# Patient Record
Sex: FEMALE | Race: BLACK OR AFRICAN AMERICAN | Employment: FULL TIME | URBAN - METROPOLITAN AREA
[De-identification: names, ages, dates, MRNs, and addresses within clinical notes are randomized per-mention and may not be internally consistent; named-entity substitution may affect disease eponyms.]

---

## 2017-02-02 ENCOUNTER — TELEPHONE (OUTPATIENT)
Dept: FAMILY MEDICINE CLINIC | Age: 52
End: 2017-02-02

## 2017-02-02 NOTE — TELEPHONE ENCOUNTER
I called and spoke with pt inquiring if she is going to see another PCP within the practice since Dr. Shani Mccormick is no longer here with the practice. Pt is currently in the process of switching to a different PCP outside of the practice. Verbalization was understood with no further questions or concerns.

## 2017-02-08 NOTE — TELEPHONE ENCOUNTER
Last visit here in November was with Hugh Goff and patient's lab was checked end of December.  She will need to set up visit to meet Dr Janene Ann as soon as this is possible

## 2017-02-09 RX ORDER — ATORVASTATIN CALCIUM 10 MG/1
TABLET, FILM COATED ORAL
Qty: 90 TAB | Refills: 2 | Status: SHIPPED | OUTPATIENT
Start: 2017-02-09

## 2017-12-04 NOTE — PERIOP NOTES
Spoke with Swati Zeng from Dr. Santana Ast office requesting orders and an H&P for surgery.   DOS: 12/6/2017

## 2017-12-05 ENCOUNTER — ANESTHESIA EVENT (OUTPATIENT)
Dept: SURGERY | Age: 52
End: 2017-12-05
Payer: OTHER GOVERNMENT

## 2017-12-06 ENCOUNTER — ANESTHESIA (OUTPATIENT)
Dept: SURGERY | Age: 52
End: 2017-12-06
Payer: OTHER GOVERNMENT

## 2017-12-06 ENCOUNTER — HOSPITAL ENCOUNTER (OUTPATIENT)
Age: 52
Setting detail: OUTPATIENT SURGERY
Discharge: HOME | End: 2017-12-06
Attending: ORTHOPAEDIC SURGERY | Admitting: ORTHOPAEDIC SURGERY
Payer: OTHER GOVERNMENT

## 2017-12-06 VITALS
HEIGHT: 70 IN | TEMPERATURE: 98 F | RESPIRATION RATE: 16 BRPM | OXYGEN SATURATION: 98 % | WEIGHT: 150 LBS | SYSTOLIC BLOOD PRESSURE: 122 MMHG | BODY MASS INDEX: 21.47 KG/M2 | DIASTOLIC BLOOD PRESSURE: 74 MMHG | HEART RATE: 74 BPM

## 2017-12-06 PROCEDURE — 77030000032 HC CUF TRNQT ZIMM -B: Performed by: ORTHOPAEDIC SURGERY

## 2017-12-06 PROCEDURE — 76060000031 HC ANESTHESIA FIRST 0.5 HR: Performed by: ORTHOPAEDIC SURGERY

## 2017-12-06 PROCEDURE — 74011250636 HC RX REV CODE- 250/636

## 2017-12-06 PROCEDURE — 74011250636 HC RX REV CODE- 250/636: Performed by: ORTHOPAEDIC SURGERY

## 2017-12-06 PROCEDURE — 74011250637 HC RX REV CODE- 250/637: Performed by: ORTHOPAEDIC SURGERY

## 2017-12-06 PROCEDURE — 76210000006 HC OR PH I REC 0.5 TO 1 HR: Performed by: ORTHOPAEDIC SURGERY

## 2017-12-06 PROCEDURE — 97161 PT EVAL LOW COMPLEX 20 MIN: CPT

## 2017-12-06 PROCEDURE — 97116 GAIT TRAINING THERAPY: CPT

## 2017-12-06 PROCEDURE — 77030018836 HC SOL IRR NACL ICUM -A: Performed by: ORTHOPAEDIC SURGERY

## 2017-12-06 PROCEDURE — 74011250636 HC RX REV CODE- 250/636: Performed by: ANESTHESIOLOGY

## 2017-12-06 PROCEDURE — 88304 TISSUE EXAM BY PATHOLOGIST: CPT | Performed by: ORTHOPAEDIC SURGERY

## 2017-12-06 PROCEDURE — 76210000026 HC REC RM PH II 1 TO 1.5 HR: Performed by: ORTHOPAEDIC SURGERY

## 2017-12-06 PROCEDURE — 74011000250 HC RX REV CODE- 250

## 2017-12-06 PROCEDURE — 76010000154 HC OR TIME FIRST 0.5 HR: Performed by: ORTHOPAEDIC SURGERY

## 2017-12-06 PROCEDURE — 77030020143 HC AIRWY LARYN INTUB CGAS -A: Performed by: ANESTHESIOLOGY

## 2017-12-06 PROCEDURE — 77030011640 HC PAD GRND REM COVD -A: Performed by: ORTHOPAEDIC SURGERY

## 2017-12-06 RX ORDER — LIDOCAINE HYDROCHLORIDE 20 MG/ML
INJECTION, SOLUTION EPIDURAL; INFILTRATION; INTRACAUDAL; PERINEURAL AS NEEDED
Status: DISCONTINUED | OUTPATIENT
Start: 2017-12-06 | End: 2017-12-06 | Stop reason: HOSPADM

## 2017-12-06 RX ORDER — SODIUM CHLORIDE 0.9 % (FLUSH) 0.9 %
5-10 SYRINGE (ML) INJECTION EVERY 8 HOURS
Status: DISCONTINUED | OUTPATIENT
Start: 2017-12-06 | End: 2017-12-06 | Stop reason: HOSPADM

## 2017-12-06 RX ORDER — OFLOXACIN 3 MG/ML
5 SOLUTION/ DROPS OPHTHALMIC 2 TIMES DAILY
COMMUNITY

## 2017-12-06 RX ORDER — DEXAMETHASONE SODIUM PHOSPHATE 4 MG/ML
INJECTION, SOLUTION INTRA-ARTICULAR; INTRALESIONAL; INTRAMUSCULAR; INTRAVENOUS; SOFT TISSUE AS NEEDED
Status: DISCONTINUED | OUTPATIENT
Start: 2017-12-06 | End: 2017-12-06 | Stop reason: HOSPADM

## 2017-12-06 RX ORDER — LIDOCAINE HYDROCHLORIDE 10 MG/ML
0.1 INJECTION, SOLUTION EPIDURAL; INFILTRATION; INTRACAUDAL; PERINEURAL AS NEEDED
Status: DISCONTINUED | OUTPATIENT
Start: 2017-12-06 | End: 2017-12-06 | Stop reason: HOSPADM

## 2017-12-06 RX ORDER — SODIUM CHLORIDE, SODIUM LACTATE, POTASSIUM CHLORIDE, CALCIUM CHLORIDE 600; 310; 30; 20 MG/100ML; MG/100ML; MG/100ML; MG/100ML
100 INJECTION, SOLUTION INTRAVENOUS CONTINUOUS
Status: DISCONTINUED | OUTPATIENT
Start: 2017-12-06 | End: 2017-12-06 | Stop reason: HOSPADM

## 2017-12-06 RX ORDER — SODIUM CHLORIDE, SODIUM LACTATE, POTASSIUM CHLORIDE, CALCIUM CHLORIDE 600; 310; 30; 20 MG/100ML; MG/100ML; MG/100ML; MG/100ML
125 INJECTION, SOLUTION INTRAVENOUS CONTINUOUS
Status: DISCONTINUED | OUTPATIENT
Start: 2017-12-06 | End: 2017-12-06 | Stop reason: HOSPADM

## 2017-12-06 RX ORDER — ONDANSETRON 2 MG/ML
INJECTION INTRAMUSCULAR; INTRAVENOUS AS NEEDED
Status: DISCONTINUED | OUTPATIENT
Start: 2017-12-06 | End: 2017-12-06 | Stop reason: HOSPADM

## 2017-12-06 RX ORDER — SODIUM CHLORIDE 0.9 % (FLUSH) 0.9 %
5-10 SYRINGE (ML) INJECTION AS NEEDED
Status: DISCONTINUED | OUTPATIENT
Start: 2017-12-06 | End: 2017-12-06 | Stop reason: HOSPADM

## 2017-12-06 RX ORDER — DIPHENHYDRAMINE HYDROCHLORIDE 50 MG/ML
12.5 INJECTION, SOLUTION INTRAMUSCULAR; INTRAVENOUS AS NEEDED
Status: DISCONTINUED | OUTPATIENT
Start: 2017-12-06 | End: 2017-12-06 | Stop reason: HOSPADM

## 2017-12-06 RX ORDER — PROPOFOL 10 MG/ML
INJECTION, EMULSION INTRAVENOUS AS NEEDED
Status: DISCONTINUED | OUTPATIENT
Start: 2017-12-06 | End: 2017-12-06 | Stop reason: HOSPADM

## 2017-12-06 RX ORDER — MIDAZOLAM HYDROCHLORIDE 1 MG/ML
INJECTION, SOLUTION INTRAMUSCULAR; INTRAVENOUS AS NEEDED
Status: DISCONTINUED | OUTPATIENT
Start: 2017-12-06 | End: 2017-12-06 | Stop reason: HOSPADM

## 2017-12-06 RX ORDER — HYDROMORPHONE HYDROCHLORIDE 1 MG/ML
.25-1 INJECTION, SOLUTION INTRAMUSCULAR; INTRAVENOUS; SUBCUTANEOUS
Status: DISCONTINUED | OUTPATIENT
Start: 2017-12-06 | End: 2017-12-06 | Stop reason: HOSPADM

## 2017-12-06 RX ORDER — ONDANSETRON 2 MG/ML
4 INJECTION INTRAMUSCULAR; INTRAVENOUS AS NEEDED
Status: DISCONTINUED | OUTPATIENT
Start: 2017-12-06 | End: 2017-12-06 | Stop reason: HOSPADM

## 2017-12-06 RX ORDER — OXYCODONE HYDROCHLORIDE 5 MG/1
5 TABLET ORAL ONCE
Status: COMPLETED | OUTPATIENT
Start: 2017-12-06 | End: 2017-12-06

## 2017-12-06 RX ORDER — FENTANYL CITRATE 50 UG/ML
INJECTION, SOLUTION INTRAMUSCULAR; INTRAVENOUS AS NEEDED
Status: DISCONTINUED | OUTPATIENT
Start: 2017-12-06 | End: 2017-12-06 | Stop reason: HOSPADM

## 2017-12-06 RX ADMIN — FENTANYL CITRATE 50 MCG: 50 INJECTION, SOLUTION INTRAMUSCULAR; INTRAVENOUS at 10:41

## 2017-12-06 RX ADMIN — ONDANSETRON 4 MG: 2 INJECTION INTRAMUSCULAR; INTRAVENOUS at 10:48

## 2017-12-06 RX ADMIN — FENTANYL CITRATE 50 MCG: 50 INJECTION, SOLUTION INTRAMUSCULAR; INTRAVENOUS at 10:35

## 2017-12-06 RX ADMIN — PROPOFOL 150 MG: 10 INJECTION, EMULSION INTRAVENOUS at 10:41

## 2017-12-06 RX ADMIN — DEXAMETHASONE SODIUM PHOSPHATE 8 MG: 4 INJECTION, SOLUTION INTRA-ARTICULAR; INTRALESIONAL; INTRAMUSCULAR; INTRAVENOUS; SOFT TISSUE at 10:48

## 2017-12-06 RX ADMIN — HYDROMORPHONE HYDROCHLORIDE 0.5 MG: 1 INJECTION, SOLUTION INTRAMUSCULAR; INTRAVENOUS; SUBCUTANEOUS at 11:30

## 2017-12-06 RX ADMIN — SODIUM CHLORIDE, SODIUM LACTATE, POTASSIUM CHLORIDE, AND CALCIUM CHLORIDE 100 ML/HR: 600; 310; 30; 20 INJECTION, SOLUTION INTRAVENOUS at 09:11

## 2017-12-06 RX ADMIN — HYDROMORPHONE HYDROCHLORIDE 0.5 MG: 1 INJECTION, SOLUTION INTRAMUSCULAR; INTRAVENOUS; SUBCUTANEOUS at 11:19

## 2017-12-06 RX ADMIN — OXYCODONE HYDROCHLORIDE 5 MG: 5 TABLET ORAL at 13:10

## 2017-12-06 RX ADMIN — MIDAZOLAM HYDROCHLORIDE 2 MG: 1 INJECTION, SOLUTION INTRAMUSCULAR; INTRAVENOUS at 10:35

## 2017-12-06 RX ADMIN — LIDOCAINE HYDROCHLORIDE 40 MG: 20 INJECTION, SOLUTION EPIDURAL; INFILTRATION; INTRACAUDAL; PERINEURAL at 10:41

## 2017-12-06 RX ADMIN — CEFAZOLIN 0.2 G: 1 INJECTION, POWDER, FOR SOLUTION INTRAMUSCULAR; INTRAVENOUS; PARENTERAL at 09:12

## 2017-12-06 NOTE — ANESTHESIA POSTPROCEDURE EVALUATION
Post-Anesthesia Evaluation and Assessment    Patient: Em Moncada MRN: [id number]  SSN: [id number]    YOB: 1965  Age: 47 y.o. Sex: female       Cardiovascular Function/Vital Signs  Visit Vitals    /74    Pulse 74    Temp 36.7 °C (98 °F)    Resp 16    Ht 5' 9.75\" (1.772 m)    Wt 68 kg (150 lb)    SpO2 98%    BMI 21.68 kg/m2       Patient is status post general anesthesia for Procedure(s):  MASS EXCISION RIGHT ANKLE . Nausea/Vomiting: None    Postoperative hydration reviewed and adequate. Pain:  Pain Scale 1: Numeric (0 - 10) (12/06/17 1131)  Pain Intensity 1: 5 (12/06/17 1131)   Managed    Neurological Status:   Neuro (WDL): Exceptions to WDL (12/06/17 1106)  Neuro  Neurologic State: Drowsy (12/06/17 1106)   At baseline    Mental Status and Level of Consciousness: Arousable    Pulmonary Status:   O2 Device: Room air (12/06/17 1113)   Adequate oxygenation and airway patent    Complications related to anesthesia: None    Post-anesthesia assessment completed.  No concerns    Signed By: Maynor White MD     December 6, 2017

## 2017-12-06 NOTE — IP AVS SNAPSHOT
[address] [phone] Patient: Gumaro Castellanos MRN: [id number] :1965 About your hospitalization You were admitted on:  2017 You last received care in the:  [facility] PACU You were discharged on:  2017 Why you were hospitalized Your primary diagnosis was:  Not on File Things You Need To Do (next 8 weeks) Follow up with Anita Navarro MD  
  
Phone:  [phone] Where:  [address] [facility], [address] [facility] Discharge Orders None A check danna indicates which time of day the medication should be taken. My Medications TAKE these medications as instructed Instructions Each Dose to Equal  
 Morning Noon Evening Bedtime  
 acyclovir 5 % ointment Commonly known as:  ZOVIRAX Your last dose was: Your next dose is:    
   
   
 Apply  to affected area every three (3) hours. atorvastatin 10 mg tablet Commonly known as:  LIPITOR Your last dose was: Your next dose is: TAKE 1 TABLET BY MOUTH EVERY DAY  
     
   
   
   
  
 CALCIUM 500+D 500 mg(1,250mg) -200 unit per tablet Generic drug:  calcium-vitamin D Your last dose was: Your next dose is: Take 2 tablets by mouth two (2) times a day. 2 Tab COLLAGEN Your last dose was: Your next dose is:    
   
   
 by Does Not Apply route. COQ10  100-100 mg-unit Cap Generic drug:  coenzyme q10-vitamin e Your last dose was: Your next dose is: Take  by mouth. LAURA EXTRACT PO Your last dose was: Your next dose is: Take  by mouth. M-VIT PO Your last dose was: Your next dose is: Take 1 Tab by mouth daily. 1 Tab  
    
   
   
   
  
 ofloxacin 0.3 % ophthalmic solution Commonly known as:  FLOXIN Your last dose was: Your next dose is:    
   
   
 Administer 5 Drops into each ear two (2) times a day. 5 Drop OMEGA 3-VITAMIN E-FISH OIL PO Your last dose was: Your next dose is: Take 1 Tab by mouth three (3) times daily (with meals). 1 Tab PROBIOTIC 4X 10-15 mg Tbec Generic drug:  B.infantis-B.ani-B.long-B.bifi Your last dose was: Your next dose is: Take  by mouth. RESTASIS 0.05 % ophthalmic emulsion Generic drug:  cycloSPORINE Your last dose was: Your next dose is: VITAMIN C 500 mg tablet Generic drug:  ascorbic acid (vitamin C) Your last dose was: Your next dose is: Take 500 mg by mouth daily. 500 mg Discharge Instructions DISCHARGE SUMMARY from your Nurse The following personal items collected during your admission are returned to you:  
Dental Appliance: Dental Appliances: None Vision:   
Hearing Aid:   
Jewelry:   
Clothing:   
Other Valuables:   
Valuables sent to safe:   
 
PATIENT INSTRUCTIONS: 
 
After general anesthesia or intravenous sedation, for 24 hours or while taking prescription Narcotics: · Limit your activities · Do not drive and operate hazardous machinery · Do not make important personal or business decisions · Do  not drink alcoholic beverages · If you have not urinated within 8 hours after discharge, please contact your surgeon on call. Report the following to your surgeon: 
· Excessive pain, swelling, redness or odor of or around the surgical area · Temperature over 100.5 · Nausea and vomiting lasting longer than 4 hours or if unable to take medications · Any signs of decreased circulation or nerve impairment to extremity: change in color, persistent  numbness, tingling, coldness or increase pain · Any questions [address] Breathing deep and coughing are very important exercises to do after surgery. Deep breathing and coughing open the little air tubes and air sacks in your lungs. You take deep breaths every day. You may not even notice - it is just something you do when you sigh or yawn. It is a natural exercise you do to keep these air passages open. After surgery, take deep breaths and cough, on purpose. Coughing and deep breathing help prevent bronchitis and pneumonia after surgery. If you had chest or belly surgery, use a pillow as a \"hug jt\" and hold it tightly to your chest or belly when you cough. DIRECTIONS: 
6. Take 10 to 15 slow deep breaths every hour while awake. 7. Breathe in deeply, and hold it for 2 seconds. 8. Exhale slowly through puckered lips, like blowing up a balloon. 9. After every 4th or 5th deep breath, hug your pillow to your chest or belly and give a hard, deep cough. Yes, it will probably hurt. But doing this exercise is very important part of healing after surgery. Take your pain medicine to help you do this exercise without too much pain. IF YOU HAVE BEEN DIAGNOSED WITH SLEEP APNEA, PLEASE USE YOUR SLEEP APNEA DEVICE OR CPAP MACHINE WHEN YOU INTEND TO NAP AFTER TAKING PAIN MEDICATION. Ankle Pumps Ankle pumps increase the circulation of oxygenated blood to your lower extremities and decrease your risk for circulation problems such as blood clots. They also stretch the muscles, tendons and ligaments in your foot and ankle, and prevent joint contracture in the ankle and foot, especially after surgeries on the legs. It is important to do ankle pump exercises regularly after surgery because immobility increases your risk for developing a blood clot.   Your doctor may also have you take an Aspirin for the next few days as well. If your doctor did not ask you to take an Aspirin, consult with him before starting Aspirin therapy on your own. Slowly point your foot forward, feeling the muscles on the top of your lower leg stretch, and hold this position for 5 seconds. Next, pull your foot back toward you as far as possible, stretching the calf muscles, and hold that position for 5 seconds. Repeat with the other foot. Perform 10 repetitions every hour while awake for both ankles if possible (down and then up with the foot once is one repetition). You should feel gentle stretching of the muscles in your lower leg when doing this exercise. If you feel pain, or your range of motion is limited, don't  Push too hard. Only go the limit your joint and muscles will let you go. If you have increasing pain, progressively worsening leg warmth or swelling, STOP the exercise and call your doctor. Below is information about the medications your doctor is prescribing after your visit: 
 
 
· Oxycodone Introducing [facility]! Dear Josefina Fowler: 
Thank you for requesting a [employer] account. Our records indicate that you already have an active [employer] account. You can access your account anytime at [email or web address] [employer] Did you know that you can access your hospital and ER discharge instructions at any time in [employer]? You can also review all of your test results from your hospital stay or ER visit. Additional Information If you have questions, please visit the Frequently Asked Questions section of the [employer] website at [email or web address] Remember, [employer] is NOT to be used for urgent needs. For medical emergencies, dial 911. Now available from your iPhone and Android! Providers Seen During Your Hospitalization Provider Specialty Primary office phone Sharita Hardy MD Orthopedic Surgery [phone] Your Primary Care Physician (PCP) Primary Care Physician Office Phone Office Fax 4888 [address] [phone] You are allergic to the following Allergen Reactions Clarithromycin Diarrhea Other (comments) Metallic taste Iodine Nausea and Vomiting Pcn (Penicillins) Rash Ancef challenge completed on 12/6/17. No reactions noted. Recent Documentation Height Weight BMI OB Status Smoking Status 1.772 m 68 kg 21.68 kg/m2 Postmenopausal Never Smoker Emergency Contacts Name Discharge Info Relation Home Work Mobile Ly Jerry DISCHARGE CAREGIVER [3] Other Relative [6] [phone] Magdi Hawthorne DISCHARGE CAREGIVER [3] Spouse [3] [phone] Patient Belongings The following personal items are in your possession at time of discharge: 
  Dental Appliances: None Please provide this summary of care documentation to your next provider. Signatures-by signing, you are acknowledging that this After Visit Summary has been reviewed with you and you have received a copy. Patient Signature:  ____________________________________________________________ Date:  ____________________________________________________________  
  
Ashish [place] Provider Signature:  ____________________________________________________________ Date:  ____________________________________________________________

## 2017-12-06 NOTE — OP NOTES
[facility]   [facility], [address]   OP NOTE       Name:  Alesha Erwin   MR#:  [id number]   :  1965   Account #:  [de-identified]    Surgery Date:  2017   Date of Adm:  2017       PREOPERATIVE DIAGNOSIS: Painful soft tissue mass, right ankle. POSTOPERATIVE DIAGNOSIS: Painful soft tissue mass, right ankle. PROCEDURES PERFORMED: Excisional biopsy, right ankle. SURGEON: Vinicius Mccracken MD    ANESTHESIA: General.    COMPLICATIONS: None encountered. TOURNIQUET TIME: 7 minutes. SPECIMENS REMOVED: Soft tissue mass was sent to Pathology. COMPLICATIONS: None. ESTIMATED BLOOD LOSS: Nil. DESCRIPTION OF PROCEDURE: After consents were obtained and   preoperative sedation, the patient was taken to the operating suites   and underwent general anesthesia without difficulty. A pneumatic   tourniquet was placed around the right thigh, and the right lower   extremity was scrubbed and draped in the usual sterile fashion. The   pea-sized mass was centered over the subcutaneous margin of the   distal fibula and was quite mobile. The incision was mapped over the   mass longitudinally, starting proximal to distal over the center of the   mass. The incision was approximately 2 cm in length. The incision was   carried through skin and subcutaneous tissue, down to the mass which   was well encapsulated and mobile on the soft tissues. The soft tissue   was elevated on the superficial surface, as well as the deep surface of   the mass and the mass was excised without difficulty. No other   abnormalities were noted in the wound. The mass was sent to   Pathology. The wound was copiously irrigated and closed with a   subcuticular 3-0 Monocryl suture. Steri-Strips were applied to secure   closure and a sterile compressive dressing was applied.  The tourniquet   was deflated and the patient was awakened from anesthesia and taken   to the recovery room in satisfactory condition.         MD Keenan Guerrero / BART   D:  12/06/2017   11:06   T:  12/06/2017   13:41   Job #:  [id number]

## 2017-12-06 NOTE — BRIEF OP NOTE
BRIEF OPERATIVE NOTE    Date of Procedure: 12/6/2017   Preoperative Diagnosis: MASS RIGHT ANKLE   Postoperative Diagnosis: MASS RIGHT ANKLE     Procedure(s):  MASS EXCISION RIGHT ANKLE   Surgeon(s) and Role:     * Chiqui Mendez MD - Primary         Assistant Staff:       Surgical Staff:  Circ-1: Shen Yi RN  Circ-Relief: Torie Manriquez RN  Circ-Intern: Lorna Shah RN  Scrub Tech-1: Ottoniel Benítez  Surg Asst-1: Joey Marques  Event Time In   Incision Start 1051   Incision Close 1057     Anesthesia: General   Estimated Blood Loss: 0  Specimens:   ID Type Source Tests Collected by Time Destination   1 : Mass right ankle Preservative Ankle  Chiqui Mendez MD 12/6/2017 1053 Pathology      Findings: 0   Complications: 0  Implants: * No implants in log *

## 2017-12-06 NOTE — ANESTHESIA PREPROCEDURE EVALUATION
Anesthetic History   No history of anesthetic complications            Review of Systems / Medical History  Patient summary reviewed and pertinent labs reviewed    Pulmonary  Within defined limits                 Neuro/Psych   Within defined limits           Cardiovascular                Pertinent negatives: Hyperlipidemia: borderline.   Exercise tolerance: >4 METS     GI/Hepatic/Renal  Within defined limits              Endo/Other             Other Findings   Comments: Single kidney-donated other kidney           Physical Exam    Airway  Mallampati: II  TM Distance: 4 - 6 cm  Neck ROM: normal range of motion   Mouth opening: Normal     Cardiovascular    Rhythm: regular  Rate: normal         Dental    Dentition: Upper dentition intact and Lower dentition intact     Pulmonary  Breath sounds clear to auscultation               Abdominal  GI exam deferred       Other Findings            Anesthetic Plan    ASA: 2  Anesthesia type: general          Induction: Intravenous  Anesthetic plan and risks discussed with: Patient

## 2017-12-06 NOTE — PROGRESS NOTES
physical Therapy EVALUATION  (Ambulatory surgery, emergency room & recovery room patients)    Patient: Geeta Mills (66 y.o. female)  Date: 12/6/2017  Primary Diagnosis and Medical History: MASS RIGHT ANKLE   Procedure(s) (LRB):  MASS EXCISION RIGHT ANKLE  (Right) Day of Surgery   Past Medical History:   Diagnosis Date    Advanced care planning/counseling discussion 4/8/16    Arrhythmia     Cervical neck pain with evidence of disc disease     C4-C5 with radicular pain    Dry eye syndrome 6/2006    started after mirena IUD    Hyperlipidemia 2/11/2010    Ill-defined condition     heart murmur    Other ill-defined conditions(799.89)     abdominal hernia - monitoring    Solitary kidney     donated right kidney     Past Surgical History:   Procedure Laterality Date    ENDOSCOPY, COLON, DIAGNOSTIC  080109    HX GYN  [id number]    Dil and curr    HX HERNIA REPAIR  06/30/2015    [facility] Ventral Hernia [facility] (Dr. Lynnette Bernstein)    HX NEPHRECTOMY  [id number]    donated to mother     HX OTHER SURGICAL  7/7/16    cystoscopy    HX TYMPANOSTOMY Left 02/18/2015    Dr. Tanner Bassett     Patient Active Problem List   Diagnosis Code    Dry eye syndrome H04.129    Hyperlipidemia E78.5    Lung nodules R91.8    Breast mass N63.0    Herpes B00.9    Solitary kidney Q60.0    Back pain M54.9     Prior Level of Function/Home Situation: Independent and without an assistive device  Personal factors and/or comorbidities impacting plan of care:     Home Situation  Home Environment: Private residence  Ordered Weight Bearing Status:  right toe touch  Equipment: crutches    EXAMINATION/PRESENTATION/DECISION MAKING:   Critical Behavior:  Neurologic State: Drowsy           Transfers:  Overall level of assistance required following instruction: contact guard assist given verbal using axillary crutches.   Ambulation:  Weight bearing status during ambulation:       Distance (ft): 50 Feet (ft)  Assistive Device: Gait belt, Crutches  Ambulation - Level of Assistance: Contact guard assistance     Stair Management:           Strength/ROM Limitations:  Generally decreased but functional  Special Tests:  Barthel Index:    Bathin  Bladder: 10  Bowels: 10  Groomin  Dressin  Feeding: 10  Mobility: 5  Stairs: 5  Toilet Use: 10  Transfer (Bed to Chair and Back): 10  Total: 75       Barthel and G-code impairment scale:  Percentage of impairment CH  0% CI  1-19% CJ  20-39% CK  40-59% CL  60-79% CM  80-99% CN  100%   Barthel Score 0-100 100 99-80 79-60 59-40 20-39 1-19   0   Barthel Score 0-20 20 17-19 13-16 9-12 5-8 1-4 0      The Barthel ADL Index: Guidelines  1. The index should be used as a record of what a patient does, not as a record of what a patient could do. 2. The main aim is to establish degree of independence from any help, physical or verbal, however minor and for whatever reason. 3. The need for supervision renders the patient not independent. 4. A patient's performance should be established using the best available evidence. Asking the patient, friends/relatives and nurses are the usual sources, but direct observation and common sense are also important. However direct testing is not needed. 5. Usually the patient's performance over the preceding 24-48 hours is important, but occasionally longer periods will be relevant. 6. Middle categories imply that the patient supplies over 50 per cent of the effort. 7. Use of aids to be independent is allowed. Darryle Chant., Barthel, D.W. (2370). Functional evaluation: the Barthel Index. [address] [facility] (14)2. Diego Hernandez kevon MAKI LantiguaJAnaMYE, Vance Vang., Mitzi South., [facility], [address] [place] [address] (). Measuring the change indisability after inpatient rehabilitation; comparison of the responsiveness of the Barthel Index and Functional [place] Measure. Journal of Neurology, Neurosurgery, and Psychiatry, 66(4), 182-163.   PATEL Torres.LOREN, CAREY Campos, & Amy Ceja MAnaA. (2004.) Assessment of post-stroke quality of life in cost-effectiveness studies: The usefulness of the Barthel Index and the EuroQoL-5D. Quality of Life Research, 13, 198-43        In compliance with CMSs Claims Based Outcome Reporting, the following G-code set was chosen for this patient based on their primary functional limitation being treated: The outcome measure chosen to determine the severity of the functional limitation was the Barthel Index with a score of 75/100 which was correlated with the impairment scale. ? Mobility - Walking and Moving Around:     - CURRENT STATUS: CJ - 20%-39% impaired, limited or restricted    - GOAL STATUS: CI - 1%-19% impaired, limited or restricted    - D/C STATUS:  ---------------To be determined---------------        Physical Therapy Evaluation Charge Determination   History Examination Presentation Decision-Making   MEDIUM  Complexity : 1-2 comorbidities / personal factors will impact the outcome/ POC  MEDIUM Complexity : 3 Standardized tests and measures addressing body structure, function, activity limitation and / or participation in recreation  LOW Complexity : Stable, uncomplicated  Other outcome measures Barthel Index  LOW       Based on the above components, the patient evaluation is determined to be of the following complexity level: LOW     Pain:Pain Scale 1: Numeric (0 - 10)  Pain Intensity 1: 3  Pain Location 1: Ankle    Education:  Role of P.T. explained to the patient:  [x]  Yes              []   No       Topics addressed: Comments:   [x]                                    Device use and technique Axillary crutches, instructed in TTWB and LE sequencing to maintain precautions. Required verbal cuing with upright activity to maintain. No loss of balance.  present and instructed in safety and LE sequencing, he verbalized understanding.    [x]                                    Transfer technique Stand pivot transfer, axillary crutches, CGA   [x] Gait training 50 feet with axillary crutches, CGA. No loss of balance, requires increased verbal cuing for TTWB adherence, able to carry over once cued.  with patient for return to home   []                                    Stair training        Patient is discharged from physical therapy at this time.     Brad Parker, PT, DPT   Time Calculation: 20 mins

## 2017-12-06 NOTE — DISCHARGE INSTRUCTIONS
DISCHARGE SUMMARY from your Nurse    The following personal items collected during your admission are returned to you:   Dental Appliance: Dental Appliances: None  Vision:    Hearing Aid:    Jewelry:    Clothing:    Other Valuables:    Valuables sent to safe:      PATIENT INSTRUCTIONS:    After general anesthesia or intravenous sedation, for 24 hours or while taking prescription Narcotics:  · Limit your activities  · Do not drive and operate hazardous machinery  · Do not make important personal or business decisions  · Do  not drink alcoholic beverages  · If you have not urinated within 8 hours after discharge, please contact your surgeon on call. Report the following to your surgeon:  · Excessive pain, swelling, redness or odor of or around the surgical area  · Temperature over 100.5  · Nausea and vomiting lasting longer than 4 hours or if unable to take medications  · Any signs of decreased circulation or nerve impairment to extremity: change in color, persistent  numbness, tingling, coldness or increase pain  · Any questions    COUGH AND DEEP BREATHE    Breathing deep and coughing are very important exercises to do after surgery. Deep breathing and coughing open the little air tubes and air sacks in your lungs. You take deep breaths every day. You may not even notice - it is just something you do when you sigh or yawn. It is a natural exercise you do to keep these air passages open. After surgery, take deep breaths and cough, on purpose. Coughing and deep breathing help prevent bronchitis and pneumonia after surgery. If you had chest or belly surgery, use a pillow as a \"hug buddy\" and hold it tightly to your chest or belly when you cough. DIRECTIONS:  6. Take 10 to 15 slow deep breaths every hour while awake. 7. Breathe in deeply, and hold it for 2 seconds. 8. Exhale slowly through puckered lips, like blowing up a balloon.   9. After every 4th or 5th deep breath, hug your pillow to your chest or belly and give a hard, deep cough. Yes, it will probably hurt. But doing this exercise is very important part of healing after surgery. Take your pain medicine to help you do this exercise without too much pain. IF YOU HAVE BEEN DIAGNOSED WITH SLEEP APNEA, PLEASE USE YOUR SLEEP APNEA DEVICE OR CPAP MACHINE WHEN YOU INTEND TO NAP AFTER TAKING PAIN MEDICATION. Ankle Pumps    Ankle pumps increase the circulation of oxygenated blood to your lower extremities and decrease your risk for circulation problems such as blood clots. They also stretch the muscles, tendons and ligaments in your foot and ankle, and prevent joint contracture in the ankle and foot, especially after surgeries on the legs. It is important to do ankle pump exercises regularly after surgery because immobility increases your risk for developing a blood clot. Your doctor may also have you take an Aspirin for the next few days as well. If your doctor did not ask you to take an Aspirin, consult with him before starting Aspirin therapy on your own. Slowly point your foot forward, feeling the muscles on the top of your lower leg stretch, and hold this position for 5 seconds. Next, pull your foot back toward you as far as possible, stretching the calf muscles, and hold that position for 5 seconds. Repeat with the other foot. Perform 10 repetitions every hour while awake for both ankles if possible (down and then up with the foot once is one repetition). You should feel gentle stretching of the muscles in your lower leg when doing this exercise. If you feel pain, or your range of motion is limited, don't  Push too hard. Only go the limit your joint and muscles will let you go. If you have increasing pain, progressively worsening leg warmth or swelling, STOP the exercise and call your doctor.      Below is information about the medications your doctor is prescribing after your visit:    Other information in your discharge envelope:  []     PRESCRIPTIONS  []     PHYSICAL THERAPY PRESCRIPTION  []     APPOINTMENT CARDS  []     Regional Anesthesia Pamphlet for block or block with On-Q Catheter from Anesthesia Service  []     Medical device information sheets/pamphlets from their    []     School/work excuse note. []     /parent work excuse note. These are general instructions for a healthy lifestyle:    *  Please give a list of your current medications to your Primary Care Provider. *  Please update this list whenever your medications are discontinued, doses are      changed, or new medications (including over-the-counter products) are added. *  Please carry medication information at all times in case of emergency situations. About Smoking  No smoking / No tobacco products / Avoid exposure to second hand smoke    Surgeon General's Warning:  Quitting smoking now greatly reduces serious risk to your health. Obesity, smoking, and sedentary lifestyle greatly increases your risk for illness and disease. A healthy diet, regular physical exercise & weight monitoring are important for maintaining a healthy lifestyle. Congestive Heart Failure  You may be retaining fluid if you have a history of heart failure or if you experience any of the following symptoms:  Weight gain of 3 pounds or more overnight or 5 pounds in a week, increased swelling in our hands or feet or shortness of breath while lying flat in bed. Please call your doctor as soon as you notice any of these symptoms; do not wait until your next office visit. Recognize signs and symptoms of STROKE:  F - face looks uneven  A - arms unable to move or move even  S - speech slurred or non-existent  T - time-call 911 as soon as signs and symptoms begin-DO NOT go         Back to bed or wait to see if you get better-TIME IS BRAIN.       Warning signs of HEART ATTACK  Call 911 if you have these symptoms    · Chest discomfort. Most heart attacks involve discomfort in the center of the chest that lasts more than a few minutes, or that goes away and comes back. It can feel like uncomfortable pressure, squeezing, fullness, or pain. · Discomfort in other areas of the upper body. Symptoms can include pain or discomfort in one or both        Arms, the back, neck, jaw, or stomach. ·  Shortness of breath with or without chest discomfort. · Other signs may include breaking out in a cold sweat, nausea, or lightheadedness    Don't wait more than five minutes to call 911 - MINUTES MATTER! Fast action can save your life. Calling 911 is almost always the fastest way to get lifesaving treatment. Emergency Medical Services staff can begin treatment when they arrive - up to an hour sooner than if someone gets to the hospital by car. [facility] MEDICATION AND SIDE EFFECT GUIDE    The [employer] MEDICATION AND SIDE EFFECT GUIDE was provided to the PATIENT AND CARE PROVIDER.   Information provided includes instruction about drug purpose and common side effects for the following medications:    · Oxycodone

## 2020-03-10 ENCOUNTER — HOSPITAL ENCOUNTER (OUTPATIENT)
Dept: CT IMAGING | Age: 55
Discharge: HOME | End: 2020-03-10
Payer: OTHER GOVERNMENT

## 2020-03-10 DIAGNOSIS — J45.909 ASTHMA: ICD-10-CM

## 2020-03-10 DIAGNOSIS — R91.8 LUNG NODULES: ICD-10-CM

## 2020-03-10 DIAGNOSIS — R07.89 OTHER CHEST PAIN: ICD-10-CM

## 2020-03-10 DIAGNOSIS — R06.00 DYSPNEA: ICD-10-CM

## 2020-03-10 PROCEDURE — 71250 CT THORAX DX C-: CPT

## 2020-07-03 ENCOUNTER — OFFICE VISIT (OUTPATIENT)
Dept: URGENT CARE | Age: 55
End: 2020-07-03

## 2020-07-03 VITALS — OXYGEN SATURATION: 96 % | TEMPERATURE: 98.4 F | HEART RATE: 77 BPM | RESPIRATION RATE: 16 BRPM

## 2020-07-03 DIAGNOSIS — Z20.822 ENCOUNTER FOR LABORATORY TESTING FOR COVID-19 VIRUS: Primary | ICD-10-CM

## 2020-07-07 ENCOUNTER — HOSPITAL ENCOUNTER (OUTPATIENT)
Age: 55
Setting detail: OUTPATIENT SURGERY
Discharge: HOME | End: 2020-07-07
Attending: INTERNAL MEDICINE | Admitting: INTERNAL MEDICINE
Payer: OTHER GOVERNMENT

## 2020-07-07 ENCOUNTER — ANESTHESIA EVENT (OUTPATIENT)
Dept: ENDOSCOPY | Age: 55
End: 2020-07-07
Payer: OTHER GOVERNMENT

## 2020-07-07 ENCOUNTER — ANESTHESIA (OUTPATIENT)
Dept: ENDOSCOPY | Age: 55
End: 2020-07-07
Payer: OTHER GOVERNMENT

## 2020-07-07 VITALS
HEART RATE: 69 BPM | BODY MASS INDEX: 21.33 KG/M2 | WEIGHT: 149 LBS | SYSTOLIC BLOOD PRESSURE: 137 MMHG | RESPIRATION RATE: 16 BRPM | OXYGEN SATURATION: 99 % | TEMPERATURE: 97.8 F | HEIGHT: 70 IN | DIASTOLIC BLOOD PRESSURE: 69 MMHG

## 2020-07-07 PROCEDURE — 74011250636 HC RX REV CODE- 250/636: Performed by: NURSE ANESTHETIST, CERTIFIED REGISTERED

## 2020-07-07 PROCEDURE — 74011000250 HC RX REV CODE- 250: Performed by: NURSE ANESTHETIST, CERTIFIED REGISTERED

## 2020-07-07 PROCEDURE — 88305 TISSUE EXAM BY PATHOLOGIST: CPT

## 2020-07-07 PROCEDURE — 76060000031 HC ANESTHESIA FIRST 0.5 HR: Performed by: INTERNAL MEDICINE

## 2020-07-07 PROCEDURE — 74011250637 HC RX REV CODE- 250/637: Performed by: INTERNAL MEDICINE

## 2020-07-07 PROCEDURE — 77030021593 HC FCPS BIOP ENDOSC BSC -A: Performed by: INTERNAL MEDICINE

## 2020-07-07 PROCEDURE — 76040000019: Performed by: INTERNAL MEDICINE

## 2020-07-07 RX ORDER — SODIUM CHLORIDE 0.9 % (FLUSH) 0.9 %
5-40 SYRINGE (ML) INJECTION EVERY 8 HOURS
Status: DISCONTINUED | OUTPATIENT
Start: 2020-07-07 | End: 2020-07-07 | Stop reason: HOSPADM

## 2020-07-07 RX ORDER — SODIUM CHLORIDE 0.9 % (FLUSH) 0.9 %
5-40 SYRINGE (ML) INJECTION AS NEEDED
Status: DISCONTINUED | OUTPATIENT
Start: 2020-07-07 | End: 2020-07-07 | Stop reason: HOSPADM

## 2020-07-07 RX ORDER — CHOLECALCIFEROL (VITAMIN D3) 125 MCG
100 CAPSULE ORAL DAILY
COMMUNITY

## 2020-07-07 RX ORDER — NALOXONE HYDROCHLORIDE 0.4 MG/ML
0.4 INJECTION, SOLUTION INTRAMUSCULAR; INTRAVENOUS; SUBCUTANEOUS
Status: DISCONTINUED | OUTPATIENT
Start: 2020-07-07 | End: 2020-07-07 | Stop reason: HOSPADM

## 2020-07-07 RX ORDER — CEFDINIR 300 MG/1
300 CAPSULE ORAL 2 TIMES DAILY
COMMUNITY

## 2020-07-07 RX ORDER — MV/FA/DHA/EPA/FISH OIL/SAW/GNK 400MCG-200
COMBINATION PACKAGE (EA) ORAL
COMMUNITY

## 2020-07-07 RX ORDER — FENTANYL CITRATE 50 UG/ML
25-200 INJECTION, SOLUTION INTRAMUSCULAR; INTRAVENOUS
Status: DISCONTINUED | OUTPATIENT
Start: 2020-07-07 | End: 2020-07-07 | Stop reason: HOSPADM

## 2020-07-07 RX ORDER — CIPROFLOXACIN AND DEXAMETHASONE 3; 1 MG/ML; MG/ML
4 SUSPENSION/ DROPS AURICULAR (OTIC) 2 TIMES DAILY
COMMUNITY

## 2020-07-07 RX ORDER — ALBUTEROL SULFATE 90 UG/1
AEROSOL, METERED RESPIRATORY (INHALATION)
COMMUNITY

## 2020-07-07 RX ORDER — FLUMAZENIL 0.1 MG/ML
0.2 INJECTION INTRAVENOUS
Status: DISCONTINUED | OUTPATIENT
Start: 2020-07-07 | End: 2020-07-07 | Stop reason: HOSPADM

## 2020-07-07 RX ORDER — MONTELUKAST SODIUM 10 MG/1
10 TABLET ORAL DAILY
COMMUNITY

## 2020-07-07 RX ORDER — BISMUTH SUBSALICYLATE 262 MG
1 TABLET,CHEWABLE ORAL DAILY
COMMUNITY

## 2020-07-07 RX ORDER — MIDAZOLAM HYDROCHLORIDE 1 MG/ML
.25-5 INJECTION, SOLUTION INTRAMUSCULAR; INTRAVENOUS
Status: DISCONTINUED | OUTPATIENT
Start: 2020-07-07 | End: 2020-07-07 | Stop reason: HOSPADM

## 2020-07-07 RX ORDER — EPINEPHRINE 0.1 MG/ML
1 INJECTION INTRACARDIAC; INTRAVENOUS
Status: DISCONTINUED | OUTPATIENT
Start: 2020-07-07 | End: 2020-07-07 | Stop reason: HOSPADM

## 2020-07-07 RX ORDER — SODIUM CHLORIDE 9 MG/ML
INJECTION, SOLUTION INTRAVENOUS
Status: DISCONTINUED | OUTPATIENT
Start: 2020-07-07 | End: 2020-07-07 | Stop reason: HOSPADM

## 2020-07-07 RX ORDER — PROPOFOL 10 MG/ML
INJECTION, EMULSION INTRAVENOUS AS NEEDED
Status: DISCONTINUED | OUTPATIENT
Start: 2020-07-07 | End: 2020-07-07 | Stop reason: HOSPADM

## 2020-07-07 RX ORDER — SODIUM CHLORIDE 9 MG/ML
50 INJECTION, SOLUTION INTRAVENOUS CONTINUOUS
Status: DISCONTINUED | OUTPATIENT
Start: 2020-07-07 | End: 2020-07-07 | Stop reason: HOSPADM

## 2020-07-07 RX ORDER — FLUTICASONE PROPIONATE AND SALMETEROL 500; 50 UG/1; UG/1
1 POWDER RESPIRATORY (INHALATION) EVERY 12 HOURS
COMMUNITY

## 2020-07-07 RX ORDER — LIDOCAINE HYDROCHLORIDE 20 MG/ML
INJECTION, SOLUTION EPIDURAL; INFILTRATION; INTRACAUDAL; PERINEURAL AS NEEDED
Status: DISCONTINUED | OUTPATIENT
Start: 2020-07-07 | End: 2020-07-07 | Stop reason: HOSPADM

## 2020-07-07 RX ORDER — ATROPINE SULFATE 0.1 MG/ML
0.5 INJECTION INTRAVENOUS
Status: DISCONTINUED | OUTPATIENT
Start: 2020-07-07 | End: 2020-07-07 | Stop reason: HOSPADM

## 2020-07-07 RX ORDER — CEVIMELINE HYDROCHLORIDE 30 MG/1
30 CAPSULE ORAL 3 TIMES DAILY
COMMUNITY

## 2020-07-07 RX ORDER — DEXTROMETHORPHAN/PSEUDOEPHED 2.5-7.5/.8
1.2 DROPS ORAL
Status: DISCONTINUED | OUTPATIENT
Start: 2020-07-07 | End: 2020-07-07 | Stop reason: HOSPADM

## 2020-07-07 RX ADMIN — SODIUM CHLORIDE: 900 INJECTION, SOLUTION INTRAVENOUS at 07:39

## 2020-07-07 RX ADMIN — PROPOFOL 30 MG: 10 INJECTION, EMULSION INTRAVENOUS at 08:22

## 2020-07-07 RX ADMIN — PROPOFOL 20 MG: 10 INJECTION, EMULSION INTRAVENOUS at 08:34

## 2020-07-07 RX ADMIN — PROPOFOL 20 MG: 10 INJECTION, EMULSION INTRAVENOUS at 08:37

## 2020-07-07 RX ADMIN — PROPOFOL 30 MG: 10 INJECTION, EMULSION INTRAVENOUS at 08:26

## 2020-07-07 RX ADMIN — PROPOFOL 70 MG: 10 INJECTION, EMULSION INTRAVENOUS at 08:20

## 2020-07-07 RX ADMIN — PROPOFOL 30 MG: 10 INJECTION, EMULSION INTRAVENOUS at 08:24

## 2020-07-07 RX ADMIN — PROPOFOL 20 MG: 10 INJECTION, EMULSION INTRAVENOUS at 08:29

## 2020-07-07 RX ADMIN — LIDOCAINE HYDROCHLORIDE 100 MG: 20 INJECTION, SOLUTION EPIDURAL; INFILTRATION; INTRACAUDAL; PERINEURAL at 08:20

## 2020-07-07 RX ADMIN — PROPOFOL 20 MG: 10 INJECTION, EMULSION INTRAVENOUS at 08:41

## 2020-07-07 NOTE — ANESTHESIA PREPROCEDURE EVALUATION
Relevant Problems   No relevant active problems       Anesthetic History   No history of anesthetic complications            Review of Systems / Medical History  Patient summary reviewed, nursing notes reviewed and pertinent labs reviewed    Pulmonary  Within defined limits          Asthma        Neuro/Psych   Within defined limits           Cardiovascular                  Exercise tolerance: >4 METS  Comments: Normal echo stress test   GI/Hepatic/Renal                Endo/Other  Within defined limits           Other Findings              Physical Exam    Airway  Mallampati: I  TM Distance: > 6 cm  Neck ROM: normal range of motion   Mouth opening: Normal     Cardiovascular    Rhythm: regular  Rate: normal         Dental  No notable dental hx       Pulmonary  Breath sounds clear to auscultation               Abdominal         Other Findings            Anesthetic Plan    ASA: 2  Anesthesia type: MAC          Induction: Intravenous  Anesthetic plan and risks discussed with: Patient

## 2020-07-07 NOTE — PROCEDURES
[address], [address] [place] [address]      Colonoscopy Operative Report    Kerry Cates  [id number]  1965      Procedure Type:   Colonoscopy --diagnostic     Indications:    Family history of coloretal cancer (screening only)   Date of last colonoscopy: 6 years ago, Polyps  No    Pre-operative Diagnosis: see indication above    Post-operative Diagnosis:  See findings below    :  Shanika Mcnamara MD    Surgical Assistant: None    Implants:  None    Referring Provider: Henri Quezada MD      Sedation:  MAC anesthesia Propofol      Procedure Details:  After informed consent was obtained with all risks and benefits of procedure explained and preoperative exam completed, the patient was taken to the endoscopy suite and placed in the left lateral decubitus position. Upon sequential sedation as per above, a digital rectal exam was performed demonstrating internal hemorrhoids. The Olympus videocolonoscope  was inserted in the rectum and carefully advanced to the cecum, which was identified by the ileocecal valve and appendiceal orifice. The cecum was identified by the ileocecal valve and appendiceal orifice. The quality of preparation was good. The colonoscope was slowly withdrawn with careful evaluation between folds. Retroflexion in the rectum was completed . Findings:   Rectum: normal  Sigmoid: normal  Descending Colon: normal  Transverse Colon: normal  Ascending Colon: normal  Cecum: normal        Specimen Removed:  none    Complications: None. EBL:  None.     Impression:    see findings    Recommendations: --Follow up with me in 2 months    Recommendation for next colonscopy in 5 years  Signed By: Shanika Mcnamara MD     7/7/2020  8:51 AM

## 2020-07-07 NOTE — DISCHARGE INSTRUCTIONS
[address] 63  [address] [facility], [address] [place] [address]    EGD and COLON DISCHARGE INSTRUCTIONS    Nathaniel Pierre  [id number]  1965    Discomfort:  Redness at IV site- apply warm compress to area; if redness or soreness persist- contact your physician  There may be a slight amount of blood passed from the rectum  Gaseous discomfort- walking, belching will help relieve any discomfort  You may not operate a vehicle for 12 hours  You may not engage in an occupation involving machinery or appliances for rest of today  You may not drink alcoholic beverages for at least 12 hours  Avoid making any critical decisions for at least 24 hour  DIET:  You may resume your regular diet - however -  remember your colon is empty and a heavy meal will produce gas. Avoid these foods:  vegetables, fried / greasy foods, carbonated drinks     ACTIVITY:  You may  resume your normal daily activities it is recommended that you spend the remainder of the day resting -  avoid any strenuous activity. CALL M.D. ANY SIGN OF:   Increasing pain, nausea, vomiting  Abdominal distension (swelling)  New increased bleeding (oral or rectal)  Fever (chills)  Pain in chest area  Bloody discharge from nose or mouth  Shortness of breath      Follow-up Instructions:   Call Dr. Colton Chandler for any questions or problems at [phone] and follow up with him in 2 months          ENDOSCOPY FINDINGS:   Your colonoscopy was normal   Your endoscopy only showed small hiatal hernia, rest of exam was normal, routine biopsies taken from esophagus to look for any inflammation.   Telephone # [phone]      Signed By: Colton Chandler MD     7/7/2020  8:53 AM       DISCHARGE SUMMARY from Nurse    The following personal items collected during your admission are returned to you:   Dental Appliance: Dental Appliances: None  Vision: Visual Aid: Glasses  Hearing Aid:    Jewelry:    Clothing:    Other Valuables:    Valuables sent to safe: Learning About Coronavirus [phone])  Coronavirus [phone]): Overview  What is coronavirus (COVID-19)? The coronavirus disease (COVID-19) is caused by a virus. It is an illness that was first found in [place], [place], in December 2019. It has since spread worldwide. The virus can cause fever, cough, and trouble breathing. In severe cases, it can cause pneumonia and make it hard to breathe without help. It can cause death. Coronaviruses are a large group of viruses. They cause the common cold. They also cause more serious illnesses like Middle East respiratory syndrome (MERS) and severe acute respiratory syndrome (SARS). COVID-19 is caused by a novel coronavirus. That means it's a new type that has not been seen in people before. This virus spreads person-to-person through droplets from coughing and sneezing. It can also spread when you are close to someone who is infected. And it can spread when you touch something that has the virus on it, such as a doorknob or a tabletop. What can you do to protect yourself from coronavirus (COVID-19)? The best way to protect yourself from getting sick is to:  · Avoid areas where there is an outbreak. · Avoid contact with people who may be infected. · Wash your hands often with soap or alcohol-based hand sanitizers. · Avoid crowds and try to stay at least 6 feet away from other people. · Wash your hands often, especially after you cough or sneeze. Use soap and water, and scrub for at least 20 seconds. If soap and water aren't available, use an alcohol-based hand . · Avoid touching your mouth, nose, and eyes. What can you do to avoid spreading the virus to others? To help avoid spreading the virus to others:  · Cover your mouth with a tissue when you cough or sneeze. Then throw the tissue in the trash. · Use a disinfectant to clean things that you touch often. · Stay home if you are sick or have been exposed to the virus.  Don't go to school, work, or public areas. And don't use public transportation. · If you are sick:  ? Leave your home only if you need to get medical care. But call the doctor's office first so they know you're coming. And wear a face mask, if you have one.  ? If you have a face mask, wear it whenever you're around other people. It can help stop the spread of the virus when you cough or sneeze. ? Clean and disinfect your home every day. Use household  and disinfectant wipes or sprays. Take special care to clean things that you grab with your hands. These include doorknobs, remote controls, phones, and handles on your refrigerator and microwave. And don't forget countertops, tabletops, bathrooms, and computer keyboards. When to call for help  Call 911 anytime you think you may need emergency care. For example, call if:  · You have severe trouble breathing. (You can't talk at all.)  · You have constant chest pain or pressure. · You are severely dizzy or lightheaded. · You are confused or can't think clearly. · Your face and lips have a blue color. · You pass out (lose consciousness) or are very hard to wake up. Call your doctor now if you develop symptoms such as:  · Shortness of breath. · Fever. · Cough. If you need to get care, call ahead to the doctor's office for instructions before you go. Make sure you wear a face mask, if you have one, to prevent exposing other people to the virus. Where can you get the latest information? The following health organizations are tracking and studying this virus. Their websites contain the most up-to-date information. Silvia Salter also learn what to do if you think you may have been exposed to the virus. · U.S. Centers for Disease Control and Prevention (CDC): The CDC provides updated news about the disease and travel advice. The website also tells you how to prevent the spread of infection. www.cdc.gov  · World Health Organization [facility]): WHO offers information about the virus outbreaks.  WHO also has travel advice. www.who.int  Current as of: April 1, 2020               Content Version: 12.4  © 6206-7545 Health[employer], Incorporated. Care instructions adapted under license by your healthcare professional. If you have questions about a medical condition or this instruction, always ask your healthcare professional. [address] any warranty or liability for your use of this information. Patient Education        Hiatal Hernia: Care Instructions  Your Care Instructions  A hiatal hernia occurs when part of the stomach bulges into the chest cavity. A hiatal hernia may allow stomach acid and juices to back up into the esophagus (acid reflux). This can cause a feeling of burning, warmth, heat, or pain behind the breastbone. This feeling may often occur after you eat, soon after you lie down, or when you bend forward, and it may come and go. You also may have a sour taste in your mouth. These symptoms are commonly known as heartburn or reflux. But not all hiatal hernias cause symptoms. Follow-up care is a key part of your treatment and safety. Be sure to make and go to all appointments, and call your doctor if you are having problems. It's also a good idea to know your test results and keep a list of the medicines you take. How can you care for yourself at home? · Take your medicines exactly as prescribed. Call your doctor if you think you are having a problem with your medicine. · Do not take aspirin or other nonsteroidal anti-inflammatory drugs (NSAIDs), such as ibuprofen (Advil, Motrin) or naproxen (Aleve), unless your doctor says it is okay. Ask your doctor what you can take for pain. · Your doctor may recommend over-the-counter medicine. For mild or occasional indigestion, antacids such as Tums, Gaviscon, Maalox, or Mylanta may help. Your doctor also may recommend over-the-counter acid reducers, such as famotidine (Pepcid AC), cimetidine (Tagamet HB), or omeprazole (Prilosec). Read and follow all instructions on the label. If you use these medicines often, talk with your doctor. · Change your eating habits. ? It's best to eat several small meals instead of two or three large meals. ? After you eat, wait 2 to 3 hours before you lie down. Late-night snacks aren't a good idea. ? Chocolate, mint, and alcohol can make heartburn worse. They relax the valve between the esophagus and the stomach. ? Spicy foods, foods that have a lot of acid (like tomatoes and oranges), and coffee can make heartburn symptoms worse in some people. If your symptoms are worse after you eat a certain food, you may want to stop eating that food to see if your symptoms get better. · Do not smoke or chew tobacco.  · If you get heartburn at night, raise the head of your bed 6 to 8 inches by putting the frame on blocks or placing a foam wedge under the head of your mattress. (Adding extra pillows does not work.)  · Do not wear tight clothing around your middle. · Lose weight if you need to. Losing just 5 to 10 pounds can help. When should you call for help? Call your doctor now or seek immediate medical care if:  · You have new or worse belly pain. · You are vomiting. Watch closely for changes in your health, and be sure to contact your doctor if:  · You have new or worse symptoms of indigestion. · You have trouble or pain swallowing. · You are losing weight. · You do not get better as expected. Where can you learn more? Go to http://sav-matthieu.info/  Enter T074 in the search box to learn more about \"Hiatal Hernia: Care Instructions. \"  Current as of: August 12, 2019               Content Version: 12.5  © 3142-7540 [employer]. Care instructions adapted under license by [employer] (which disclaims liability or warranty for this information).  If you have questions about a medical condition or this instruction, always ask your healthcare professional. [employer] disclaims any warranty or liability for your use of this information.

## 2020-07-07 NOTE — PROCEDURES
295 [facility]  174 [facility], [address] [facility] (EGD) Procedure Note    Nathaniel Pierre  1965  [id number]      Procedure: Endoscopic Gastroduodenoscopy with biopsy    Indication:  Dysphagia/odynophagia     Pre-operative Diagnosis: see indication above    Post-operative Diagnosis: see findings below    : Colton Chandler MD    Surgical Assistant: None    Implants:  None    Referring Provider:  Ford Gilbert MD      Anesthesia/Sedation:  MAC anesthesia Propofol        Procedure Details     After infomed consent was obtained for the procedure, with all risks and benefits of procedure explained the patient was taken to the endoscopy suite and placed in the left lateral decubitus position. Following sequential administration of sedation as per above, the endoscope was inserted into the mouth and advanced under direct vision to third portion of the duodenum. A careful inspection was made as the gastroscope was withdrawn, including a retroflexed view of the proximal stomach; findings and interventions are described below. Findings:   Esophagus:hiatal hernia 2 cm in size   1 cm irregular Z line, biopsies taken to r/o Chang's    Rest of esophagus was normal, random biopsies taken  Stomach: normal   Duodenum: normal      Therapies:  none    Specimens: as above         EBL: None      Complications:   None; patient tolerated the procedure well. Impression:    -See post-procedure diagnoses.     Recommendations:  -Continue acid suppression.  -colonoscopy today    Signed By: Colton Chandler MD     7/7/2020  8:48 AM

## 2020-07-07 NOTE — ANESTHESIA POSTPROCEDURE EVALUATION
Post-Anesthesia Evaluation and Assessment    Patient: Dhruv Leone MRN: [id number]  SSN: [id number]    YOB: 1965  Age: 53 y.o. Sex: female      I have evaluated the patient and they are stable and ready for discharge from the PACU. Cardiovascular Function/Vital Signs  Visit Vitals  /55   Pulse 75   Temp 36.8 °C (98.3 °F)   Resp 10   Ht 5' 9.75\" (1.772 m)   Wt 67.6 kg (149 lb)   SpO2 100%   BMI 21.53 kg/m²       Patient is status post MAC anesthesia for Procedure(s):  COLONOSCOPY  ESOPHAGOGASTRODUODENOSCOPY (EGD) awaiting 7/3  ESOPHAGOGASTRODUODENAL (EGD) BIOPSY. Nausea/Vomiting: None    Postoperative hydration reviewed and adequate. Pain:  Pain Scale 1: Numeric (0 - 10) (07/07/20 0901)  Pain Intensity 1: 0 (07/07/20 0901)   Managed    Neurological Status: At baseline    Mental Status, Level of Consciousness: Alert and  oriented to person, place, and time    Pulmonary Status:   O2 Device: CO2 nasal cannula (07/07/20 0843)   Adequate oxygenation and airway patent    Complications related to anesthesia: None    Post-anesthesia assessment completed. No concerns    Signed By: Ana Paula Simon MD     July 7, 2020              Procedure(s):  COLONOSCOPY  ESOPHAGOGASTRODUODENOSCOPY (EGD) awaiting 7/3  ESOPHAGOGASTRODUODENAL (EGD) BIOPSY. MAC    <BSHSIANPOST>    INITIAL Post-op Vital signs:   Vitals Value Taken Time   /62 7/7/2020  8:56 AM   Temp     Pulse 75 7/7/2020  9:03 AM   Resp 63 7/7/2020  9:03 AM   SpO2 98 % 7/7/2020  9:03 AM   Vitals shown include unvalidated device data.

## 2020-07-07 NOTE — ROUTINE PROCESS
[facility] 1965 
[id number] Situation: 
Verbal report received from: Austin Rn 
Procedure: Procedure(s): 
COLONOSCOPY 
ESOPHAGOGASTRODUODENOSCOPY (EGD) awaiting 7/3 ESOPHAGOGASTRODUODENAL (EGD) BIOPSY Background: 
 
Preoperative diagnosis: FAMILY HISTORY OF COLON CANCER, GLOBUS SENSATION Postoperative diagnosis: 1. Irregular Z line 2. Hiatal Hernia 3. Normal Colonoscopy :  Dr. Sarita Cosby Assistant(s): Endoscopy Technician-1: Vipin Newton Endoscopy RN-1: Matthieu Boyce RN Specimens:  
ID Type Source Tests Collected by Time Destination 1 : Random Esophagus Biopsies Preservative   Neisha Calero MD 7/7/2020 0825 Pathology 2 : [place] Biopsies Irregular Z line Rule Out Chang's Preservative   Neisha Calero MD 7/7/2020 9398 Pathology H. Pylori  no Assessment: 
Intra-procedure medications Anesthesia gave intra-procedure sedation and medications, see anesthesia flow sheet yes Intravenous fluids: NS@ [place] Vital signs stable Abdominal assessment: round and soft Recommendation: 
Discharge patient per MD order. Family or Friend Permission to share finding with family or friend yes

## 2020-07-07 NOTE — H&P
History of Present Illness Billy Hu AGACNP; 5/28/2020 10:06 AM)  The patient is a 53year old female who presents with a complaint of Acid Reflux. The patient presents due to new symptoms. The onset of the acid reflux has been sudden and has been occurring in an increasing pattern for 8 months. The course has been gradually worsening. The acid reflux is described as a mild to moderate globus sensation and frequent throat clearing. There are no aggravating factors and  has no relieving factors. The symptoms have been associated with pulmonary symptoms and globus sensation,  while the symptoms have not been associated with abdominal distention, abdominal pain, belching, dysphagia, heartburn, indigestion, melena, nausea or NSAIDs. The patient's current medication use includes: omeprazole (Prilosec) (20 mg, started 3 days ago) and PPI is once a day and is  not considered effective by patient . Note for \"Acid Reflux\": She has been having issues since last October. She has been seeing allergist, rheumatologist, and pulmonologist - diagnosed with Sjogren's. She reports normal labs, chest CT, and echo. She has constant discomfort on the right side of her chest. Since October she complains of frequent throat clearing to the point it feels raw some days. She has globus sensation. She is a kidney donor, avoid's NSAID's. She was just started on PPI 3 days ago. She describes herself as a \"health nut\". Problem List/Past Medical Smooth Perez TINAAna Orr; 5/28/2020 9:38 AM)  Sjogren's Disease    Hypercholesterolemia    Family history of cancer of GI tract (V16.0  Z80.0)   colonoscopy under [facility]  Abdominal swelling, generalized (789.37  R19.07)   her pain was in right flank with radiation to pelvis, now almost subsided, found to have UTI, on Cipro since yesterday, labs and CT scan of abdomen were normal, h/o right nephrectomy, her sister had colon cancer, last colonoscopy 8/09 was normal, she also has chronic c/o bloating  trial of align    Past Surgical History Michelle Mitchell; 5/28/2020 9:38 AM)  Kidney Removal - Right   Donation  Hernia Repair  [2015]: History of placement of ear tubes (V45.89  Z96.22)   Bilateral.    Allergies (Pat Palacio; 5/28/2020 9:38 AM)  Penicillins    Iodinated Contrast      Medication History (Pat Palacio; 5/28/2020 9:38 AM)  Atorvastatin Calcium  (10MG Tablet, Oral daily) Active. Advair Diskus  (250-50MCG/DOSE Aero Pow Br Act, Inhalation two times daily) Active. ProAir HFA  (108 (90 Base)MCG/ACT Aerosol Soln, Inhalation prn) Active. Singulair  (10MG Tablet, Oral prn) Active. Cevimeline HCl  (30MG Capsule, Oral two times daily) Active. valACYclovir HCl  (500MG Tablet, Oral prn) Active. Acyclovir  (5% Ointment, External prn) Active. Xiidra  (5% Solution, Ophthalmic two times daily) Active. Refresh Tears  (0.5% Solution, Ophthalmic) Active. Krill Oil  (500MG Capsule, Oral) Active. CoQ10  (100MG Capsule, Oral) Active. Multivitamins  (Oral daily) Active. Vitamin D  (1000UNIT Capsule, Oral daily) Active. Vitamin C  (1000MG Tablet, Oral daily) Active. Omega-3 EPA Fish Oil  (1205MG Capsule, Oral daily) Active. Medications Reconciled     Family History Michelle Palacio; 5/28/2020 9:38 AM)  Colon Cancer   Sister. Lung Cancer   Father. Emphysema   Father. Hypertension   Mother. Kidney Disease   Mother. Social History Michelle Mitchell; 5/28/2020 9:38 AM)  Alcohol Use   Occasional alcohol use. Blood Transfusion   No.  Tobacco Use   Never smoker. Marital status   . Employment status   Full-time. Diagnostic Studies History Michelle Mitchell; 5/28/2020 9:38 AM)  Colonoscopy      Health Maintenance History Michelle Palacio; 5/28/2020 9:38 AM)  Flu Vaccine   no  Pneumovax   no        Review of Systems Michelle Mitchell; 5/28/2020 9:37 AM)  General Not Present- Chronic Fatigue, Poor Appetite, Weight Gain and Weight Loss.   Skin Not Present- Itching, Rash and Skin Color Changes. HEENT Not Present- Hearing Loss and Vertigo. Respiratory Not Present- Difficulty Breathing and TB exposure. Cardiovascular Not Present- Chest Pain, Use of Antibiotics before Dental Procedures and Use of Blood Thinners. Gastrointestinal Present- See HPI. Musculoskeletal Not Present- Arthritis, Hip Replacement Surgery and Knee Replacement Surgery. Neurological Not Present- Weakness. Psychiatric Not Present- Depression. Endocrine Not Present- Diabetes and Thyroid Problems. Hematology Not Present- Anemia. Vitals Rickie Puri; 5/28/2020 9:38 AM)  5/28/2020 9:38 AM  Weight: 151 lb   Height: 69 in   Body Surface Area: 1.83 m²   Body Mass Index: 22.3 kg/m²                Physical Exam Kathie HOWARD Mayte AGACNP; 5/28/2020 10:07 AM)  The physical exam findings are as follows:  Note: telehealth        Assessment & Plan Kathie Hu AGACN; 5/28/2020 10:09 AM)  Family history of cancer of GI tract (V16.0  Z80.0)  Story: Colonoscopy 9/2014: normal  Impression: Sister with colon cancer, due for screening colonoscopy. Current Plans  Colonoscopy (18311) (Discussed risks and benefits with the patient to include:; perforation, post polypectomy, or post biopsy bleeding, missed lesions, and sedation reactions.)  Started Suprep Bowel Prep Kit 17.5-3.13-1.6GM/177ML, 354 Milliliter Take as directed before Colonoscopy, 354 Milliliter, 1 day starting 05/28/2020, No Refill. Future Plans  8/4/2019: COLONOSCOPIC SURGERIES: DIAGNOSTIC COLONOSCOPY (57511) - one time  Acid reflux (530.81  K21. 9)  Throat clearing (784.99  R68.89)  Globus sensation (306.4  R09.89)  Impression: Since October she complains of frequent throat clearing to the point it feels raw some days. She has globus sensation. She was just started on PPI 3 days ago. Will continue PPI and further evaluate with EGD.   Current Plans  Due to this being a TeleHealth encounter (During QOJSP-94 public health emergency), evaluation of the following organ systems was limited: Vitals/Constitutional/EENT/Resp/CV/GI//MS/Neuro/Skin/Heme-Lymph-Imm. Pursuant to the emergency declaration under the [facility], [address] [place] [address] authority and the [employer] Act, this Virtual Visit was conducted with patient's (and/or legal guardian's) consent, to reduce the risk of exposure to COVID-19 and provide necessary medical care. Services were provided through a video synchronous discussion virtually to substitute for in-person encounter. Pt Education - How to access health information online: discussed with patient and provided information. ENDOSCOPY, UPPER GI, DIAGNOSTIC (17671)  Patient is to call me for any questions or concerns. Follow up office visit after procedure  Date of Surgery Update:  Malia Hoffman was seen and examined. History and physical has been reviewed. The patient has been examined. There have been no significant clinical changes since the completion of the originally dated History and Physical.  The patient was counseled at length about the risks of nishant Covid-19 in the tali-operative and post-operative states including the recovery window of their procedure. The patient was made aware that nishant Covid-19 after a surgical procedure may worsen their prognosis for recovering from the virus and lend to a higher morbidity and or mortality risk. The patient was given the options of postponing their procedure. All of the risks, benefits, and alternatives were discussed. The patient does  wish to proceed with the procedure.     Signed By: Juan Ruiz MD     July 7, 2020 8:20 AM

## 2020-07-09 LAB — SARS-COV-2, NAA: NOT DETECTED

## 2021-04-23 ENCOUNTER — TRANSCRIBE ORDER (OUTPATIENT)
Dept: SCHEDULING | Age: 56
End: 2021-04-23

## 2021-04-23 DIAGNOSIS — K21.9 ACID REFLUX: Primary | ICD-10-CM

## 2021-05-12 ENCOUNTER — TELEPHONE (OUTPATIENT)
Dept: CARDIAC REHAB | Age: 56
End: 2021-05-12

## 2021-05-12 NOTE — TELEPHONE ENCOUNTER
5/12/2021 Cardiac Rehab: Called Ms. Emmanuel Sheehan to make her aware that I still have not received a prior auth from her insurance, but she was aware. Her PCP and GI office had not communicated until yesterday, so her P.A. is in process. She said she would come with something Friday.   Mirza Marley

## 2021-05-14 ENCOUNTER — HOSPITAL ENCOUNTER (OUTPATIENT)
Dept: CARDIAC REHAB | Age: 56
End: 2021-05-14
Attending: INTERNAL MEDICINE

## 2021-06-02 ENCOUNTER — TELEPHONE (OUTPATIENT)
Dept: CARDIAC REHAB | Age: 56
End: 2021-06-02

## 2021-06-02 NOTE — TELEPHONE ENCOUNTER
6/2/2021 Cardiac Rehab: Called Ms. Shaw Led to discuss her approval from [address] [place] [address], but they did not code it correctly for [facility] nutrition services. I left a voicemail message for Ms. Hawthorne to call me back and let me know if she wants to cancel and reschedule or be a retail out of pocket patient, so she would not have to worry about the insurance prior auth.  Samra Maloney

## 2021-06-03 ENCOUNTER — TELEPHONE (OUTPATIENT)
Dept: CARDIAC REHAB | Age: 56
End: 2021-06-03

## 2021-06-03 NOTE — TELEPHONE ENCOUNTER
6/3/2021 Cardiac Rehab: Called Ms. Emmanuel Sheehan to remind of nutrition appointment on Friday, 6/4/2021. Left voicemail message.   Mirza Marley

## 2021-06-04 ENCOUNTER — APPOINTMENT (OUTPATIENT)
Dept: CARDIAC REHAB | Age: 56
End: 2021-06-04
Attending: INTERNAL MEDICINE

## 2021-06-04 ENCOUNTER — HOSPITAL ENCOUNTER (OUTPATIENT)
Dept: CARDIAC REHAB | Age: 56
Discharge: HOME | End: 2021-06-04
Attending: INTERNAL MEDICINE
Payer: OTHER GOVERNMENT

## 2021-06-04 VITALS — WEIGHT: 147 LBS | BODY MASS INDEX: 21.05 KG/M2 | HEIGHT: 70 IN

## 2021-06-04 PROCEDURE — 97802 MEDICAL NUTRITION INDIV IN: CPT | Performed by: DIETITIAN, REGISTERED

## 2021-06-04 NOTE — PROGRESS NOTES
[address] NOTE  DATE: 2021      REFERRING PHYSICIAN: Dr. Jose Francisco Thompson    NAME: Amena Caputo : 1965 AGE: 63 y.o. GENDER: female    REASON FOR VISIT: acid reflux (per pt reflux is \"hit or miss\" some days can be all day and other days it is calmer, always worse in the morning, does not burn, no or very rare nausea) - started 2 years ago and initially thought allergies but this was ruled out; endoscopy revealed small hernia; colonoscopy was clean    PAST MEDICAL HISTORY:   Patient Active Problem List   Diagnosis Code    Dry eye syndrome H04.129    Hyperlipidemia E78.5    Lung nodules R91.8    Breast mass N63.0    Herpes B00.9    Solitary kidney Q60.0    Back pain M54.9     Sjogren's syndrome      LABS:   Lab Results   Component Value Date/Time    Cholesterol, total 181 2016 09:21 AM    HDL Cholesterol 55 2016 09:21 AM    LDL, calculated 112 (H) 2016 09:21 AM    VLDL, calculated 14 2016 09:21 AM    Triglyceride 72 2016 09:21 AM    CHOL/HDL Ratio 3.6 2010 08:15 AM     No results found for: HBA1C, WPW4DJFA, YWQ2TIGZ    MEDICATIONS/SUPPLEMENTS:   [unfilled]  Prior to Admission medications    Medication Sig Start Date End Date Taking? Authorizing Provider   fluticasone propionate (FLONASE NA) by Nasal route. Provider, Historical   garlic 082 mg cap Take  by mouth. Provider, Historical   multivitamin (ONE A DAY) tablet Take 1 Tab by mouth daily. Provider, Historical   FLAXSEED OIL PO Take 1,400 mg by mouth. Provider, Historical   turmeric (CURCUMIN) by Does Not Apply route. Provider, Historical   krill oil 500 mg cap Take  by mouth. Provider, Historical   co-enzyme Q-10 (CoQ-10) 100 mg capsule Take 100 mg by mouth daily. Provider, Historical   fluticasone propion-salmeteroL (Advair Diskus) 500-50 mcg/dose diskus inhaler Take 1 Puff by inhalation every twelve (12) hours.     Provider, Historical   albuterol (ProAir HFA) 90 mcg/actuation inhaler Take  by inhalation. Provider, Historical   montelukast (SINGULAIR) 10 mg tablet Take 10 mg by mouth daily. Provider, Historical   cevimeline (EVOXAC) 30 mg capsule Take 30 mg by mouth three (3) times daily. Provider, Historical   ciprofloxacin-dexamethasone (Ciprodex) 0.3-0.1 % otic suspension Administer 4 Drops in left ear two (2) times a day. Provider, Historical   cefdinir (OMNICEF) 300 mg capsule Take 300 mg by mouth two (2) times a day. Provider, Historical   ofloxacin (FLOXIN) 0.3 % ophthalmic solution Administer 5 Drops into each ear two (2) times a day. Provider, Historical   B.infantis-B.ani-B.long-B.bifi (PROBIOTIC 4X) 10-15 mg TbEC Take  by mouth. Provider, Historical   atorvastatin (LIPITOR) 10 mg tablet TAKE 1 TABLET BY MOUTH EVERY DAY 2/9/17   Pan Clark MD   GINGER ROOT (GINGER EXTRACT PO) Take  by mouth. Provider, Historical   acyclovir (ZOVIRAX) 5 % ointment Apply  to affected area every three (3) hours. 11/17/15   Víctor Bingham MD   RESTASIS 0.05 % ophthalmic emulsion  12/21/14   Provider, Historical   calcium-vitamin D (CALCIUM 500+D) 500 mg(1,250mg) -200 unit per tablet Take 2 tablets by mouth two (2) times a day. 6/2/11   Provider, Historical   PV W-O BRAYAN/FERROUS FUMARATE/FA (M-VIT PO) Take 1 Tab by mouth daily. 2/11/10   Provider, Historical   ascorbic acid (VITAMIN C) 500 mg tablet Take 500 mg by mouth daily. 2/11/10   Provider, Historical     She states she has stopped taking the garlic supplement, no longer needs to use inhalers, ear drops, omnicef; zovirax is as needed; discontinued Calcium w/ Vitamin D due to elevated Vit D labs, discontinued ferrous fumarate; She has started taking Matys (clove, ginger, apple cider vinegar, tumeric & coriander) and PureDGL Plus (licorice, aloe vera, slippery elm, marshmallow) both for acid reflux OTC for the past 3 to 4 months (not found it helpful).   Has tried famotidine and omeprazole both for 30 days with no relief, also no relief with Pepcid OTC. 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     REPORTED WEIGHT HISTORY: lost about 15 lbs but after her upper GI had diarrhea for 2 to 3 weeks and lost 15 lbs last fall and has not regained it; 160 was her heaviest but 140-150 is more typical (had gained 15 lbs at the beginning of the pandemic)        ANTHROPOMETRICS:    Ht Readings from Last 1 Encounters:   06/04/21 5' 10\" (1.778 m)      Wt Readings from Last 10 Encounters:   06/04/21 66.7 kg (147 lb)   07/07/20 67.6 kg (149 lb)   12/06/17 68 kg (150 lb)   11/29/16 70.6 kg (155 lb 9.6 oz)   07/29/16 71.8 kg (158 lb 3.2 oz)   04/08/16 69.9 kg (154 lb)   03/30/16 70.3 kg (155 lb)   03/25/16 70.3 kg (155 lb)   01/06/16 71.8 kg (158 lb 4.8 oz)   11/16/15 70.3 kg (155 lb)      IBW:150 # +/- 10%  %IBW: 98% +/- 10%    BMI: 21.1 kg/M2  Category: Normal / healthy          NUTRITION ASSESSMENT:    FOOD ALLERGIES/INTOLERANCES: no known food allergies; in the past had an allergic reaction to oranges so continues to avoid them    24 HOUR DIET RECALL  Breakfast  Acacia tea w/ Stevia and raw honey, thin slice of lemon; honey herbal throat lozenge   Snack  Spinach, blackberry, banana, flax seed, and water smoothie   Lunch  Tuna mixed with egg white, a little ybarra, sweet relish & seasoned with onion powder & garlic powder with multigrain wheat crackers   Snack  Skinny Pop popcorn (original)   Dinner  6:30 pm Homemade taco with ground turkey with taco seasoning (does not have often but didn't bother her last night), black beans, corn, plant based cheese, and chips   Snack  7 pm Paul Moreno Rajwinder Stewart states she avoids all recommended foods to avoid for reflux and all she has identified that have bothered her in addition, eats healthy in general with all organic, lots of plants and very little red meat. She drinks half her body weight in water. She sleeps with an elevated pillow (wedge pillow + another on top), no food after 7 pm. Prefers to eat all organic. She does plant based cheese to avoid dairy due to reflux. Does use some dry mouth tablets but they are mint based so she tries to limit due to awareness it can aggravate reflux. Sometimes feels like acid reflux is regardless to what she eats because some days the same food won't bother her when other days it did. Read that chewing gum helps so sometimes will do that and does find it helpful. Avoids fried foods; bakes / broils or uses air-fryer      NUTRITION DIAGNOSIS:  No nutrition diagnosis at this time        ESTIMATED ENERGY NEEDS:    Not calculated    NUTRITION INTERVENTION:  Nutrition 60 minute one-on-one education & goal setting with Rajwinder Stewart    Reviewed with Rajwinder Stewart relevant labs compared to ideals.     Reviewed weight history and patient's verbalized weight goal as well as any real or perceived barriers to obtaining the goal. Collaborated with patient to set a specific short and long term weight goal.     Conducted a verbal diet recall and assessed for environmental, financial, psychosocial, physical and comorbidities that may impact the food and eating patterns / behaviors of [address] Lopez with patient to set specific nutrient goals as well as specific food / behavior changes that will help patient meet the overall goal of: acid reflux management    NUTRITION EDUCATION / HANDOUTS PROVIDED:  - Label reading  - list of supplements that may make acid reflux worse (Giggle)  - Academy of Nutrition and Dietetics patient education for dietary management of gastroesophageal reflux disease (GERD) including recommended food lists      PATIENT GOALS:    Weight Goal: maintain 140-150 lbs      Nutrition Goals: Other:  - continue behavioral strategies that minimize reflux  - eliminate all potential triggers (foods and supplements) for 2-3 weeks to see if symptoms resolve; add all back to see if symptoms return; if so, eliminate all again then re-introduce individually to establish which foods / supplements need to be strictly avoided to control symptoms  - if no relief follow-up with GI to rule out/in celiac disease if not already done (pt with auto-immune disorder at higher risk of celiac and persistent reflux can be an atypical presentation in adults)              Specific tips and techniques to facilitate compliance with above recommendations were provided and discussed. Lillie Lewis verbalized understanding. The patient was encouraged to contact me as needed.       Follow-up: as needed              Dejan Gray RD, [facility]

## 2023-07-10 ENCOUNTER — OFFICE VISIT (OUTPATIENT)
Age: 58
End: 2023-07-10
Payer: OTHER GOVERNMENT

## 2023-07-10 VITALS
WEIGHT: 150 LBS | DIASTOLIC BLOOD PRESSURE: 80 MMHG | HEIGHT: 70 IN | HEART RATE: 82 BPM | SYSTOLIC BLOOD PRESSURE: 136 MMHG | BODY MASS INDEX: 21.47 KG/M2 | OXYGEN SATURATION: 98 %

## 2023-07-10 DIAGNOSIS — R51.9 NEW ONSET OF HEADACHES AFTER AGE 50: Primary | ICD-10-CM

## 2023-07-10 DIAGNOSIS — R41.3 COMPLAINTS OF MEMORY DISTURBANCE: ICD-10-CM

## 2023-07-10 DIAGNOSIS — G43.909 EPISODIC MIGRAINE: ICD-10-CM

## 2023-07-10 PROCEDURE — 99205 OFFICE O/P NEW HI 60 MIN: CPT | Performed by: PSYCHIATRY & NEUROLOGY

## 2023-07-10 RX ORDER — SUMATRIPTAN 50 MG/1
50 TABLET, FILM COATED ORAL PRN
Qty: 9 TABLET | Refills: 2 | Status: SHIPPED | OUTPATIENT
Start: 2023-07-10

## 2023-07-10 ASSESSMENT — PATIENT HEALTH QUESTIONNAIRE - PHQ9
SUM OF ALL RESPONSES TO PHQ QUESTIONS 1-9: 0
SUM OF ALL RESPONSES TO PHQ QUESTIONS 1-9: 0
2. FEELING DOWN, DEPRESSED OR HOPELESS: 0
SUM OF ALL RESPONSES TO PHQ QUESTIONS 1-9: 0
SUM OF ALL RESPONSES TO PHQ QUESTIONS 1-9: 0
SUM OF ALL RESPONSES TO PHQ9 QUESTIONS 1 & 2: 0
1. LITTLE INTEREST OR PLEASURE IN DOING THINGS: 0

## 2023-07-10 NOTE — PROGRESS NOTES
NEUROLOGY  NEW PATIENT EVALUATION/CONSULTATION       PATIENT NAME: Pinky Ordonez    MRN: [id number]    REASON FOR CONSULTATION: Headaches    07/10/23      Previous records (physician notes, laboratory reports, and radiology reports) and imaging studies were reviewed and summarized. My recommendations will be communicated back to the patient's physician(s) via electronic medical record and/or by [address] mail. HISTORY OF PRESENT ILLNESS:  Pinky Ordonez is a 61 y.o.  female presenting for evaluation of headaches. She reports onset of symptoms in 2018. Location: Bi-temporal  Character: Throbbing  Intensity: On average 8/10   Frequency: 2x monthly  # HA free days per month: 28  Duration: 2 hours  Aura: None  Associated Sx with HA: no nausea/vomiting, +photophobia. Neurological ROS: Denies focal weakness, numbness or vision loss associated with headaches  Systemic ROS:   No h/o snoring  Caffeine use: None  H/O Head trauma: None  Depressive or anxiety Sx: None     Any change in pattern of HA? As above, reports h/o \"sinus headaches\" bi-temporal throbbing since her teenage years, occasional cervicalgia     Triggers: Exercise.  Non-positional.  Alleviating factors: Rest, warm compress  FHx HA/migraine: Brother-migraines    Treatment so far: N/A    Investigations so far: None      PAST MEDICAL HISTORY:  Past Medical History:   Diagnosis Date    Advanced care planning/counseling discussion 4/8/16    Arrhythmia     Asthma     Cervical neck pain with evidence of disc disease     C4-C5 with radicular pain    Dry eye syndrome 6/2006    started after mirena IUD    Hyperlipidemia 2/11/2010    Ill-defined condition     heart murmur    Other ill-defined conditions(799.89)     abdominal hernia - monitoring    Sjogren's syndrome ([address]) 2018    Dr Peri Cowden    Solitary kidney     donated right kidney       PAST SURGICAL HISTORY:  Past Surgical History:   Procedure Laterality Date    COLONOSCOPY  2009, 2014

## 2023-07-11 ENCOUNTER — TELEPHONE (OUTPATIENT)
Age: 58
End: 2023-07-11

## 2023-07-11 LAB
TSH SERPL DL<=0.005 MIU/L-ACNC: 2.67 UIU/ML (ref 0.45–4.5)
VIT B12 SERPL-MCNC: 1448 PG/ML (ref 232–1245)

## 2023-07-11 NOTE — TELEPHONE ENCOUNTER
Called the Pt. However had to leave a voice mail with the below messages. Per Dr. Oniel Corral:    No evidence of B12 deficiency or hypothyroidism contributing to her presentation  Labs reviewed without significant abnormalities.

## 2023-08-08 ENCOUNTER — HOSPITAL ENCOUNTER (OUTPATIENT)
Facility: HOSPITAL | Age: 58
Discharge: HOME | End: 2023-08-11
Attending: PSYCHIATRY & NEUROLOGY
Payer: OTHER GOVERNMENT

## 2023-08-08 DIAGNOSIS — R51.9 NEW ONSET OF HEADACHES AFTER AGE 50: ICD-10-CM

## 2023-08-08 PROCEDURE — 70544 MR ANGIOGRAPHY HEAD W/O DYE: CPT

## 2023-08-08 PROCEDURE — 70551 MRI BRAIN STEM W/O DYE: CPT

## 2023-08-09 ENCOUNTER — TELEPHONE (OUTPATIENT)
Age: 58
End: 2023-08-09

## 2023-08-09 DIAGNOSIS — E23.6 ENLARGED PITUITARY GLAND (HCC): Primary | ICD-10-CM

## 2023-08-09 DIAGNOSIS — E23.7 PITUITARY ABNORMALITY (HCC): Primary | ICD-10-CM

## 2023-08-09 DIAGNOSIS — Q28.3 CEREBRAL VASCULAR MALFORMATION: ICD-10-CM

## 2023-08-09 DIAGNOSIS — I67.1 CEREBRAL ANEURYSM: ICD-10-CM

## 2023-08-09 NOTE — TELEPHONE ENCOUNTER
----- Message from [facility], DO sent at 8/9/2023 12:01 PM EDT -----  MRI Brain reveals pituitary enlargement-will require follow up with dedicated pituitary MRI for further evaluation WWO contrast. I see she has allergy to iodine. Does she tolerate MRI contrast with jason? MRA reveals questionable L ICA aneurysm and b/l carotid irregularity. I am referring her to [facility] for further evaluation of these findings.  MAL  ----- Message -----  From: Jocelyne Johnson Incoming Orders Results To Radiant  Sent: 8/9/2023   5:52 AM EDT  To: [facility], DO

## 2023-08-09 NOTE — TELEPHONE ENCOUNTER
[facility] asking if you have received patient's reports?      Pls call Patito  to confirm  [phone]

## 2023-08-09 NOTE — TELEPHONE ENCOUNTER
Called the Pt. However had to leave a voice mail. Asked her to give us a call back to go over the MRI results per Dr. Dillard Oppenheim message below:  MRI Brain reveals pituitary enlargement-will require follow up with dedicated pituitary MRI for further evaluation WWO contrast. I see she has allergy to iodine. Does she tolerate MRI contrast with jason? MRA reveals questionable L ICA aneurysm and b/l carotid irregularity. I am referring her to [facility] for further evaluation of these findings.

## 2023-08-18 ENCOUNTER — HOSPITAL ENCOUNTER (OUTPATIENT)
Facility: HOSPITAL | Age: 58
Discharge: HOME | End: 2023-08-18
Attending: PSYCHIATRY & NEUROLOGY
Payer: OTHER GOVERNMENT

## 2023-08-18 DIAGNOSIS — E23.6 ENLARGED PITUITARY GLAND (HCC): ICD-10-CM

## 2023-08-18 PROCEDURE — A9575 INJ GADOTERATE MEGLUMI 0.1ML: HCPCS | Performed by: RADIOLOGY

## 2023-08-18 PROCEDURE — 70553 MRI BRAIN STEM W/O & W/DYE: CPT | Performed by: PSYCHIATRY & NEUROLOGY

## 2023-08-18 PROCEDURE — 6360000004 HC RX CONTRAST MEDICATION: Performed by: RADIOLOGY

## 2023-08-18 RX ORDER — GADOTERATE MEGLUMINE 376.9 MG/ML
14 INJECTION INTRAVENOUS
Status: COMPLETED | OUTPATIENT
Start: 2023-08-18 | End: 2023-08-18

## 2023-08-18 RX ADMIN — GADOTERATE MEGLUMINE 14 ML: 376.9 INJECTION INTRAVENOUS at 13:52

## 2023-09-08 ENCOUNTER — TELEPHONE (OUTPATIENT)
Age: 58
End: 2023-09-08

## 2023-09-08 NOTE — TELEPHONE ENCOUNTER
----- Message from [facility], DO sent at 8/30/2023  8:10 AM EDT -----  MRI pituitary shows no evidence of pituitary lesion but possible pituitary hyperplasia. Stable incidental pineal cyst. Advise PCP follow up to complete endocrine labs as deemed appropriate.  D  ----- Message -----  From: Jocelyne Johnson Incoming Orders Results To Radiant  Sent: 8/21/2023   8:20 AM EDT  To: [facility], DO

## 2023-09-08 NOTE — TELEPHONE ENCOUNTER
Called and spoke to the Pt. Per Dr. Wilian Javier:      MRI pituitary shows no evidence of pituitary lesion but possible pituitary hyperplasia. Stable incidental pineal cyst. Advise PCP follow up to complete endocrine labs as deemed appropriate. The Pt. Will call her PCP for follow up. Also she states she is keeping a headache every day on the left temporal side of her head. A dull achy  type headache. Asking could this be caused by any of the above?

## 2023-09-13 ENCOUNTER — TELEPHONE (OUTPATIENT)
Age: 58
End: 2023-09-13

## 2023-09-13 ENCOUNTER — TELEMEDICINE (OUTPATIENT)
Age: 58
End: 2023-09-13

## 2023-09-13 DIAGNOSIS — Q28.3: Primary | ICD-10-CM

## 2023-09-13 NOTE — TELEPHONE ENCOUNTER
ISABELM for pt requesting a cb so that we can schedule her an appt with Dr. Gagan Montero, in 3 weeks, after she gets additional imaging. (CTA)    Also, Marzena De Leon asked me to find out where the patient wants to get her imaging done, so that we can fax over an order to the facility.

## 2023-09-13 NOTE — TELEPHONE ENCOUNTER
Called and spoke to the Pt.  Per Dr. Oscar Dunn:    Suspect incidental to her current headaches but will require serial imaging for surveillance

## 2023-10-09 ENCOUNTER — TELEPHONE (OUTPATIENT)
Age: 58
End: 2023-10-09

## 2023-10-09 NOTE — TELEPHONE ENCOUNTER
Requesting office notes and test results be faxed to [address] [place] [address] @ [phone]

## 2023-10-10 ENCOUNTER — TELEPHONE (OUTPATIENT)
Age: 58
End: 2023-10-10

## 2023-10-10 NOTE — TELEPHONE ENCOUNTER
Called [facility]. Informed them that the patient has asked us to send it to test results be faxed to [address] [facility] @ [phone] but states that it looks she gave us the wrong fax number. Called patient and she stated she apologize as she given us her vision fax number. Confirmed that she goes [address] [id number]  [address] Cec[address] Ko[address], [address], [address]. Will re-fax notes.

## 2023-10-10 NOTE — TELEPHONE ENCOUNTER
Seble Weaver with [facility] stated they received documentation that looks like it was meant to be sent to [facility]. Requesting clarification that it was sent to the correct location.

## 2023-10-16 ENCOUNTER — TELEPHONE (OUTPATIENT)
Age: 58
End: 2023-10-16

## 2023-10-16 NOTE — TELEPHONE ENCOUNTER
Spoke to patient regarding her scheduling her CTA and iodine allergy. Patient reports she has not scheduled imaging. Phone number for Scheduling given to patient. Patient stated she had contrast for prior imaging without any problems. Advised patient we will give her premedication prior to imaging. Patient stated understanding.

## 2023-10-16 NOTE — TELEPHONE ENCOUNTER
----- Message from Mauri Carlson sent at 9/25/2023  4:51 PM EDT -----  Patient states that Dr. Sammi Dee was going to order a CTA for her.

## 2023-11-10 DIAGNOSIS — I67.1 CEREBRAL ANEURYSM: Primary | ICD-10-CM

## 2023-11-13 ENCOUNTER — OFFICE VISIT (OUTPATIENT)
Age: 58
End: 2023-11-13
Payer: OTHER GOVERNMENT

## 2023-11-13 VITALS
DIASTOLIC BLOOD PRESSURE: 60 MMHG | HEIGHT: 70 IN | HEART RATE: 99 BPM | SYSTOLIC BLOOD PRESSURE: 122 MMHG | WEIGHT: 150 LBS | OXYGEN SATURATION: 98 % | BODY MASS INDEX: 21.47 KG/M2

## 2023-11-13 DIAGNOSIS — I67.1 CEREBRAL ANEURYSM: ICD-10-CM

## 2023-11-13 DIAGNOSIS — R51.9 CHRONIC DAILY HEADACHE: Primary | ICD-10-CM

## 2023-11-13 DIAGNOSIS — G43.909 EPISODIC MIGRAINE: ICD-10-CM

## 2023-11-13 PROCEDURE — 99214 OFFICE O/P EST MOD 30 MIN: CPT | Performed by: PSYCHIATRY & NEUROLOGY

## 2023-11-13 RX ORDER — CALCIUM CARBONATE 300MG(750)
400 TABLET,CHEWABLE ORAL DAILY
Qty: 90 TABLET | Refills: 1 | Status: SHIPPED | OUTPATIENT
Start: 2023-11-13

## 2023-11-13 NOTE — PROGRESS NOTES
None.  Brain Parenchyma/Brainstem: Normal for age. No acute infarction. Basal Cisterns: Normal.  Flow Voids: Normal.  Additional Comments: Pituitary fullness with upward convex margin toward the  suprasellar cistern. No definite optic chiasm displacement. Posterior Circulation: No flow limiting stenosis or occlusion. Anterior Circulation: No flow limiting stenosis or occlusion. Additional Comments: Slight wall irregularity involving the lateral surface of  the left proximal cavernous internal carotid artery could be due to  atherosclerosis rather than a small extradural 1-2 mm aneurysm. There is also  mild wall irregularity of the bilateral cervical internal carotid artery segment  just below the skull base. Impression  1. No acute intracranial process. Pituitary enlargement, nonspecific warrants  correlation with endocrinological parameters and if indicated dedicated  pre-/postcontrast pituitary MRI sequences. 2. No flow limiting stenosis. Mild irregularity of the extradural proximal left  cavernous internal carotid artery, could represent atherosclerosis rather than a  small 1-2 mm aneurysm. Additionally there is subtle irregularity suspected in  the distal bilateral cervical internal carotid arteries, could be due to  artifact or an entity such as fibromuscular dysplasia. CTA head/neck with be  helpful for further characterization. 23X      MRA HEAD WO CONTRAST 08/08/2023    Narrative  INDICATION: Headache, unspecified Headache, unspecified / Reason for exam:->new  onset exercise induced headache, late age of headache onset eval for inracranial  pathology    COMPARISON: None    TECHNIQUE: Multiplanar multisequence acquisition without contrast of the brain. 3 D time of flight MRA of the head was performed. FINDINGS:    Ventricles: Midline, no hydrocephalus. Intracranial Hemorrhage: None. Brain Parenchyma/Brainstem: Normal for age. No acute infarction.   Basal Cisterns: Normal.  Flow Voids:

## 2023-11-17 ENCOUNTER — HOSPITAL ENCOUNTER (OUTPATIENT)
Facility: HOSPITAL | Age: 58
Discharge: HOME | End: 2023-11-17
Attending: RADIOLOGY
Payer: OTHER GOVERNMENT

## 2023-11-17 DIAGNOSIS — I67.1 CEREBRAL ANEURYSM: ICD-10-CM

## 2023-11-17 PROCEDURE — 70496 CT ANGIOGRAPHY HEAD: CPT

## 2023-11-17 PROCEDURE — 6360000004 HC RX CONTRAST MEDICATION: Performed by: RADIOLOGY

## 2023-11-17 RX ADMIN — IOPAMIDOL 100 ML: 755 INJECTION, SOLUTION INTRAVENOUS at 08:21

## 2024-03-03 ENCOUNTER — OFFICE VISIT (OUTPATIENT)
Age: 59
End: 2024-03-03

## 2024-03-03 VITALS
HEIGHT: 70 IN | TEMPERATURE: 98.3 F | WEIGHT: 149 LBS | OXYGEN SATURATION: 98 % | BODY MASS INDEX: 21.33 KG/M2 | DIASTOLIC BLOOD PRESSURE: 83 MMHG | RESPIRATION RATE: 18 BRPM | HEART RATE: 92 BPM | SYSTOLIC BLOOD PRESSURE: 129 MMHG

## 2024-03-03 DIAGNOSIS — J02.9 SORE THROAT: Primary | ICD-10-CM

## 2024-03-03 DIAGNOSIS — J02.9 VIRAL PHARYNGITIS: ICD-10-CM

## 2024-03-03 LAB
GROUP A STREP ANTIGEN, POC: NEGATIVE
VALID INTERNAL CONTROL, POC: NORMAL

## 2024-05-17 ENCOUNTER — OFFICE VISIT (OUTPATIENT)
Age: 59
End: 2024-05-17

## 2024-05-17 ENCOUNTER — TELEPHONE (OUTPATIENT)
Age: 59
End: 2024-05-17

## 2024-05-17 VITALS
WEIGHT: 147 LBS | OXYGEN SATURATION: 98 % | SYSTOLIC BLOOD PRESSURE: 128 MMHG | HEART RATE: 82 BPM | BODY MASS INDEX: 21.09 KG/M2 | DIASTOLIC BLOOD PRESSURE: 80 MMHG

## 2024-05-17 DIAGNOSIS — R41.3 COMPLAINTS OF MEMORY DISTURBANCE: ICD-10-CM

## 2024-05-17 DIAGNOSIS — E23.6 HYPERPLASIA OF ANTERIOR PITUITARY (HCC): Primary | ICD-10-CM

## 2024-05-17 NOTE — TELEPHONE ENCOUNTER
Patient came for her follow up visit for today and her her period of window with gina  even she didn't use all of the visit for this year and after she called gina , they stated that PCP has to send Referral for her to cover the visit and we should receive the referral for the today visit. I let patient call before check her in to make sure.

## 2024-05-17 NOTE — PROGRESS NOTES
Neurology Clinic Follow up Note    Patient ID:  Adriana Stone  [id number]  58 y.o.  1965      Ms. Stone is here for follow up today of  Chief Complaint   Patient presents with    OTHER     Pt returning for H/O Pt reports headaches are much improved still having memory issues  forgetfulness           Last Appointment With Me:  11/13/2023    \"Adriana Stone is presenting for evaluation of headaches. She reports onset of symptoms in 2018.     Location: Bi-temporal  Character: Throbbing  Intensity: On average 8/10   Frequency: 2x monthly  # HA free days per month: 28  Duration: 2 hours  Aura: None  Associated Sx with HA: no nausea/vomiting, +photophobia.    Neurological ROS: Denies focal weakness, numbness or vision loss associated with headaches  Systemic ROS:   No h/o snoring  Caffeine use: None  H/O Head trauma: None  Depressive or anxiety Sx: None     Any change in pattern of HA? As above, reports h/o \"sinus headaches\" bi-temporal throbbing since her teenage years, occasional cervicalgia     Triggers: Exercise. Non-positional.  Alleviating factors: Rest, warm compress  FHx HA/migraine: Brother-migraines    Treatment so far: N/A\"    Interval History:   Reports L supra-orbital headaches have resolved since last visit. She did take Mg supplementation initially for these which appeared helpful.   No further exertional headaches reported.   She does report some difficulty recalling names, completing sentences due to word finding issues. She wonders whether this is due to her Sjogren's which appears recently uncontrolled.     PMHx/ PSHx/ FHx/ SHx:  Reviewed and unchanged previous visit.   Past Medical History:   Diagnosis Date    Advanced care planning/counseling discussion 4/8/16    Arrhythmia     Asthma     Cervical neck pain with evidence of disc disease     C4-C5 with radicular pain    Dry eye syndrome 6/2006    started after mirena IUD    Headache     Hearing loss     Hyperlipidemia 2/11/2010    
were spent reviewing and interpreting the cognitive testing results, integrating patient data, and discussing the results with the patient.

## 2024-06-21 ENCOUNTER — HOSPITAL ENCOUNTER (OUTPATIENT)
Facility: HOSPITAL | Age: 59
Discharge: HOME | End: 2024-06-21
Attending: PSYCHIATRY & NEUROLOGY
Payer: OTHER GOVERNMENT

## 2024-06-21 DIAGNOSIS — E23.6 HYPERPLASIA OF ANTERIOR PITUITARY (HCC): ICD-10-CM

## 2024-06-21 PROCEDURE — 70553 MRI BRAIN STEM W/O & W/DYE: CPT | Performed by: PSYCHIATRY & NEUROLOGY

## 2024-06-21 PROCEDURE — A9575 INJ GADOTERATE MEGLUMI 0.1ML: HCPCS | Performed by: DENTIST

## 2024-06-21 PROCEDURE — 2500000003 HC RX 250 WO HCPCS: Performed by: DENTIST

## 2024-06-21 RX ORDER — GADOTERATE MEGLUMINE 376.9 MG/ML
13 INJECTION INTRAVENOUS ONCE
Status: COMPLETED | OUTPATIENT
Start: 2024-06-21 | End: 2024-06-21

## 2024-06-21 RX ADMIN — GADOTERATE MEGLUMINE 13 ML: 376.9 INJECTION INTRAVENOUS at 08:25

## 2024-06-24 ENCOUNTER — TELEPHONE (OUTPATIENT)
Age: 59
End: 2024-06-24

## 2024-06-24 NOTE — TELEPHONE ENCOUNTER
Requesting Np scheduling. Referral on file. Advised that it may take up to 2 wks for a call back for scheduling.

## 2024-06-25 ENCOUNTER — TELEPHONE (OUTPATIENT)
Age: 59
End: 2024-06-25

## 2024-06-25 NOTE — TELEPHONE ENCOUNTER
Called and spoke with the Pt. Per Dr. 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

## 2024-06-25 NOTE — TELEPHONE ENCOUNTER
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 RMD  ----- Message -----  From: [facility] Incoming Orders Results To Radiant  Sent: 6/24/2024  10:22 AM EDT  To: Natasha Olea DO

## 2024-06-28 NOTE — TELEPHONE ENCOUNTER
Patient states [facility] has advised her that they have not received the referral for Neuro psych. Please contact

## 2024-10-04 NOTE — PROGRESS NOTES
Neurointerventional Surgery Clinic Note        Patient: Adriana Stone MRN: [id number]  SSN: [id number]    YOB: 1965  Age: 59 y.o.  Sex: female      Subjective:      Adriana Stone is a 59 y.o. female who is being seen for doubtful left cavernous ICA aneurysm noted on MR angiogram of the head performed for headache..    Past Medical History:   Diagnosis Date    Advanced care planning/counseling discussion 4/8/16    Arrhythmia     Asthma     Cervical neck pain with evidence of disc disease     C4-C5 with radicular pain    Dry eye syndrome 6/2006    started after mirena IUD    Headache     Hearing loss     Hyperlipidemia 2/11/2010    Ill-defined condition     heart murmur    Memory disorder     Other ill-defined conditions(799.89)     abdominal hernia - monitoring    Sjogren's syndrome (HCC) 2018    Dr Shaw    Solitary kidney     donated right kidney     Past Surgical History:   Procedure Laterality Date    COLONOSCOPY  2009, 2014    COLONOSCOPY N/A 7/7/2020    COLONOSCOPY performed by Senthil Mills MD at [facility] ENDOSCOPY    GYN  [id number]    Dil and curr    HERNIA REPAIR  06/30/2015    [facility] Ventral Hernia Repair (Dr. Juan)    KIDNEY REMOVAL  [id number]    donated to mother     OTHER SURGICAL HISTORY  7/7/16    cystoscopy    OTHER SURGICAL HISTORY  03/2018    Bilateral Tube placement and Bilater Eustachian Tube Dilatation Laure Duke    OTHER SURGICAL HISTORY Right 12/2017    R ankle tumpor extraction    TYMPANOSTOMY TUBE PLACEMENT Left 02/18/2015    Dr. Duke      Family History   Problem Relation Age of Onset    Hypertension Mother     Other Mother         shingles    High Cholesterol Mother     Kidney Disease Mother         htn    Dementia Mother     Cancer Father         lung    Cancer Sister 38        colon    High Cholesterol Brother     Headache Brother     Migraines Brother     Asthma Son      Social History     Tobacco Use    Smoking status: Never    Smokeless tobacco: Never   Substance

## 2025-02-07 ENCOUNTER — OFFICE VISIT (OUTPATIENT)
Age: 60
End: 2025-02-07
Payer: OTHER GOVERNMENT

## 2025-02-07 DIAGNOSIS — R47.89 WORD FINDING PROBLEM: Primary | ICD-10-CM

## 2025-02-07 DIAGNOSIS — R41.844 IMPAIRED EXECUTIVE FUNCTIONING: ICD-10-CM

## 2025-02-07 PROCEDURE — 90791 PSYCH DIAGNOSTIC EVALUATION: CPT | Performed by: STUDENT IN AN ORGANIZED HEALTH CARE EDUCATION/TRAINING PROGRAM

## 2025-02-07 NOTE — PROGRESS NOTES
well-groomed. Appears younger than her documented age.   Level of consciousness: Alert, attentive.   Ambulation/Gait: Ambulated independently; gait was unremarkable.   Motor: WNL.   Sensory: Vision and hearing were adequate for the purposes of the evaluation.  Speech: Normal for rate, tone, and prosody. Isolated instances of word finding difficulty, which came back to her within just a few seconds.   Language comprehension: Intact.  Thought processes/Insight: Logical thought processes; good insight.   Mood and Affect: Euthymic mood; normal range of affect.   Current suicidality/homicidality: Did not endorse.   Rapport: Adequately established.     ASSESSMENT & PLAN     Ms. Adriana Stone is a 59 y.o., right-handed, , Black /  female who presented with concerns about her language (word-finding) and executive abilities which has reportedly affected her ability to function greatly. This is most prominent in her work as the  where she is not able to bring up the word that she would like to use at will. It does not always come to her. Names of familiar people have also become harder for her to recall. Functionally, she is performing all daily ADLs independently and without incident. There were no significant mood factors of concern. Ms. Stone was aware that she works in a very highly-demanding and faced-paced work environment. We spent time discussing the cognitive load of these types of positions as possible contributing factors. Other noted factors that she brought up was her Sjrogren's syndrome which is reportedly pervasive with regard to several aspects of her health. She is followed in Neurology for daily headaches; this has reportedly improved but she did endorse a headache when she wakes up and disappears once she starts her day. Furthermore, recent neuroimaging revealed an enlarged pituitary gland. At this time, I am not well-aware of any onesimo cognitive

## 2025-02-07 NOTE — PATIENT INSTRUCTIONS
Thank you for choosing us for your neuropsychological evaluation. We will examine your overall cognitive functioning, including areas such as memory, attention, concentration, language, and problem solving.   You were seen for an initial visit by Dr. Solange Bui (Neuropsychologist) today. Please refer to the dates listed under \"What's Next\" for your scheduled Procedure (testing) and Follow-Up appointments.    Reminders for your testing appointment:   If you wear glasses/contacts and/or hearing assistive devices, please be sure to bring them with you to your testing appointment.   You will be working with my Psychometrist, Libertad, on the day of testing. She is specially trained to administer these cognitive tests to you.   Please prepare accordingly, as the duration of testing may take a few hours to complete.     Please be on the lookout for an email to access the mood/personality questionnaire. Complete this BEFORE your Procedure/Testing date. Subject line will read: \"From Dr. Bui ([facility] Neuropsychology)...\"    To reschedule or cancel your appointment, please call [phone].   In case of an emergency, call [phone] or report to the nearest emergency department.  It has been our pleasure to work with you, and we look forward to speaking with you soon.

## 2025-03-03 ENCOUNTER — PROCEDURE VISIT (OUTPATIENT)
Age: 60
End: 2025-03-03

## 2025-03-03 DIAGNOSIS — G31.84 MILD COGNITIVE IMPAIRMENT: Primary | ICD-10-CM

## 2025-03-03 DIAGNOSIS — R41.844 IMPAIRED EXECUTIVE FUNCTIONING: ICD-10-CM

## 2025-03-03 DIAGNOSIS — R47.89 WORD FINDING PROBLEM: ICD-10-CM

## 2025-03-13 NOTE — PROGRESS NOTES
only thing they do is help her finish her sentences because they notice she struggles to find words. She has not received any negative feedback from her supervisors about her perceived cognitive inefficiencies. Ms. Stone is  and has two children. She described an adequate social support system.     BEHAVIORAL OBSERVATIONS     Arrival: On time and unaccompanied.   General appearance: Appropriately dressed and well-groomed. Appears younger than her documented age.   Level of consciousness: Alert, attentive.   Ambulation/Gait: Ambulated independently; gait was unremarkable.   Motor: WNL.   Sensory: Vision and hearing were adequate for the purposes of the evaluation.  Speech: Normal for rate, tone, and prosody. Isolated instances of word finding difficulty, which came back to her within just a few seconds.   Language comprehension: Intact.  Thought processes/Insight: Logical thought processes; good insight.   Mood and Affect: Euthymic mood; normal range of affect.   Current suicidality/homicidality: Did not endorse.   Rapport: Adequately established.   Test Observations: Normal range of emotional expression. Recognized and coped adequately with her perceived difficulties. No unusual response style or need cueing/re-instruction. Overall, she was consistently alert and attentive to task, motivated for testing, cooperative, and appeared to be engaged in the testing process.     TESTING     TESTS ADMINISTERED:   Select performance validity tests; [employer] Test of Premorbid Functioning (TOPF); Digit Span, Coding, and Visual Puzzles subtests from the Wechsler Adult Intelligence Scale-4th Edition (WAIS-IV); Jean Verbal Learning Test - Revised (HVLT-R), form 1; Logical Memory (adult) subtest of the Wechsler Memory Scale, 4th edition (WMS-IV); Brief Visuospatial Memory Test - Revised (BVMT-R), form 1; Naming subtest of the Neuropsychological Assessment Battery (NAB); Controlled Oral Word

## 2025-03-14 ENCOUNTER — OFFICE VISIT (OUTPATIENT)
Age: 60
End: 2025-03-14
Payer: OTHER GOVERNMENT

## 2025-03-14 DIAGNOSIS — G31.84 MILD COGNITIVE IMPAIRMENT: Primary | ICD-10-CM

## 2025-03-14 PROCEDURE — 96132 NRPSYC TST EVAL PHYS/QHP 1ST: CPT | Performed by: STUDENT IN AN ORGANIZED HEALTH CARE EDUCATION/TRAINING PROGRAM

## 2025-03-14 NOTE — PROGRESS NOTES
NEUROPSYCHOLOGICAL EVALUATION   Feedback     Prior to seeing the patient for today's visit, I reviewed pertinent records, including the previously completed report, the records in Epic, and any updated visits from other providers since the patient's last visit. Ms. Adriana Stone was seen for a feedback appointment via in-person to discuss the results of her neuropsychological evaluation.     DISCUSSION OF RESULTS AND RECOMMENDATIONS:   I provided feedback about Ms. Stone's neuropsychological testing results in detail:   Cognitive diagnosis: Mild cognitive impairment (MCI)  Contributing factors:   Neurodegenerative processes NOT supported.   Consider contributions from pituitary enlargement as seen on neuroimaging and/or her diagnosis of Sjrogen's syndrome, the latter of which can manifest neurologically as cognitive impairment. However, definitive cognitive profiles for either of these factors have not been clearly delineated, per my review of current research. It should be noted that, according to research, cognitive dysfunction in the majority of individuals with Sjogren's syndrome tends to remain stable over time and progression to dementia due to this condition is rare.   Other relevant discussion points:   Discussed work-related transitions and process of disability in context of Sjogren's syndrome.   Ms. Stone expressed agreement with the results and that the findings were consistent with what is noticed in daily life.     Recommendations from the report were discussed in detail. She expressed understanding. The patient and/or their care partner(s) will:   Follow up with neurologist for continued/ongoing care of neurological functions.   Discussed cognitive compensatory strategies relevant to her word finding difficulties and select executive-based weaknesses.   Re-eval in the future should she notice worsening of cognitive functions. Best way for us to assess change over time.     I answered any

## 2025-03-30 ENCOUNTER — OFFICE VISIT (OUTPATIENT)
Age: 60
End: 2025-03-30

## 2025-03-30 VITALS
RESPIRATION RATE: 18 BRPM | BODY MASS INDEX: 24.26 KG/M2 | OXYGEN SATURATION: 96 % | HEIGHT: 69 IN | HEART RATE: 84 BPM | WEIGHT: 163.8 LBS | DIASTOLIC BLOOD PRESSURE: 87 MMHG | SYSTOLIC BLOOD PRESSURE: 143 MMHG | TEMPERATURE: 98.8 F

## 2025-03-30 DIAGNOSIS — S76.012A HIP STRAIN, LEFT, INITIAL ENCOUNTER: Primary | ICD-10-CM

## 2025-03-30 DIAGNOSIS — R03.0 ELEVATED BLOOD PRESSURE READING: ICD-10-CM

## 2025-03-30 RX ORDER — VALACYCLOVIR HYDROCHLORIDE 500 MG/1
TABLET, FILM COATED ORAL
COMMUNITY
Start: 2025-02-17

## 2025-03-30 RX ORDER — VIT C/B6/B5/MAGNESIUM/HERB 173 50-5-6-5MG
CAPSULE ORAL
COMMUNITY

## 2025-03-30 RX ORDER — OMEGA-3 FATTY ACIDS/FISH OIL 300-1000MG
2 CAPSULE ORAL
COMMUNITY

## 2025-03-30 RX ORDER — METHYLPREDNISOLONE 4 MG/1
TABLET ORAL
Qty: 1 KIT | Refills: 0 | Status: SHIPPED | OUTPATIENT
Start: 2025-03-30 | End: 2025-04-05

## 2025-03-30 RX ORDER — LOTEPREDNOL ETABONATE 5 MG/ML
SUSPENSION/ DROPS OPHTHALMIC
COMMUNITY
Start: 2025-01-17

## 2025-03-30 NOTE — PROGRESS NOTES
COLONOSCOPY N/A 7/7/2020    COLONOSCOPY performed by Senthil Mills MD at [facility] ENDOSCOPY    GYN  [id number]    Dil and curr    HERNIA REPAIR  06/30/2015    [facility] Ventral Hernia Repair (Dr. Juan)    KIDNEY REMOVAL  [id number]    donated to mother     OTHER SURGICAL HISTORY  7/7/16    cystoscopy    OTHER SURGICAL HISTORY  03/2018    Bilateral Tube placement and Bilater Eustachian Tube Dilatation - Leilani    OTHER SURGICAL HISTORY Right 12/2017    R ankle tumpor extraction    TYMPANOSTOMY TUBE PLACEMENT Left 02/18/2015    Dr. Duke        Social History:   Social Connections: Not on file        Patient Care Team:  Carly Lopez MD as PCP - Jimbo Donnelly DO as Physician    Patient Active Problem List   Diagnosis    Dry eye syndrome    Lung nodules    Breast mass    Back pain    Herpes    Hyperlipidemia            I ADVISED PATIENT TO GO TO ER IF SYMPTOMS WORSEN , CHANGE OR FAILS TO IMPROVE.    I have discussed the diagnosis with the patient and the intended plan as seen in the above orders.  The patient has received an after-visit summary and questions were answered concerning future plans.  I have discussed medication side effects and warnings with the patient as well. The patient agrees and understands above plan.       An electronic signature was used to authenticate this note.  -- SHARON Cervantes NP     (Please note that part of this dictation were completed with voice recognition software.  Quite often unanticipated grammatical, syntax, homophones, and other interpretive errors are inadvertently transcribed by the computer software.  Please disregard those errors.  Please excuse any errors that have escaped my final proofreading.)

## 2025-03-30 NOTE — PATIENT INSTRUCTIONS
Take Medrol Dosepak as prescribed.  Do not take OTC NSAIDs while taking this medication.  May take OTC Tylenol.  May also use OTC lidocaine patch for pain.  See attached care handout for hip strain. Follow-up with PCP if symptoms do not resolve. Go to the ED for any acute or worsening symptoms  DASH Diet: After Your Visit  Your Care Instructions  The DASH diet is an eating plan that can help lower your blood pressure. DASH stands for Dietary Approaches to Stop Hypertension. Hypertension is high blood pressure.  The DASH diet focuses on eating foods that are high in calcium, potassium, and magnesium. These nutrients can lower blood pressure. The foods that are highest in these nutrients are fruits, vegetables, low-fat dairy products, nuts, seeds, and legumes. But taking calcium, potassium, and magnesium supplements instead of eating foods that are high in those nutrients does not have the same effect. The DASH diet also includes whole grains, fish, and poultry.  The DASH diet is one of several lifestyle changes your doctor may recommend to lower your high blood pressure. Your doctor may also want you to decrease the amount of sodium in your diet. Lowering sodium while following the DASH diet can lower blood pressure even further than just the DASH diet alone.  Follow-up care is a key part of your treatment and safety. Be sure to make and go to all appointments, and call your doctor if you are having problems. It’s also a good idea to know your test results and keep a list of the medicines you take.  How can you care for yourself at home?  Following the DASH diet  Eat 4 to 5 servings of fruit each day. A serving is 1 medium-sized piece of fruit, ½ cup chopped or canned fruit, 1/4 cup dried fruit, or 4 ounces (½ cup) of fruit juice. Choose fruit more often than fruit juice.  Eat 4 to 5 servings of vegetables each day. A serving is 1 cup of lettuce or raw leafy vegetables, ½ cup of chopped or cooked vegetables, or 4

## 2025-05-02 ENCOUNTER — OFFICE VISIT (OUTPATIENT)
Age: 60
End: 2025-05-02
Payer: OTHER GOVERNMENT

## 2025-05-02 VITALS
RESPIRATION RATE: 18 BRPM | OXYGEN SATURATION: 98 % | DIASTOLIC BLOOD PRESSURE: 80 MMHG | SYSTOLIC BLOOD PRESSURE: 120 MMHG | HEART RATE: 87 BPM

## 2025-05-02 DIAGNOSIS — G31.84 MILD COGNITIVE IMPAIRMENT: Primary | ICD-10-CM

## 2025-05-02 DIAGNOSIS — G43.909 EPISODIC MIGRAINE: ICD-10-CM

## 2025-05-02 DIAGNOSIS — E23.6: ICD-10-CM

## 2025-05-02 PROCEDURE — 99214 OFFICE O/P EST MOD 30 MIN: CPT | Performed by: PSYCHIATRY & NEUROLOGY

## 2025-05-02 NOTE — PROCEDURES
Cognitive Testing Evaluation    Introduction:    HALEIGH BLACKWELL  1965  Female  This 59 year old female was administered a battery of neurocognitive testing on 05/02/2025.    Reason for Testing:  Mild cognitive impairment    Tests Administered:  Trails A, Trails B, Stroop, Digit Symbol Substitution, Immediate Recognition, Delayed Recognition  The active test administration time was 8 minutes    Test Results:  Cognitive testing was provided via a battery of cognitive assessments. The pattern of test scores indicate that results are valid.    A Clinical Report with further description of scores and results is also available.    Overall: Patient tested in the 41st percentile (scaled standard score of 96).  Trails A: Patient tested in the 88th percentile (scaled standard score of 118).  Trails B: Patient tested in the 83rd percentile (scaled standard score of 114).  Stroop: Patient tested in the 92nd percentile (scaled standard score of 121).  Digit Symbol Substitution: Patient tested in the 24th percentile (scaled standard score of 89).  Immediate Recognition: Patient tested in the 21st percentile (scaled standard score of 88).  Delayed Recognition: Patient tested in the 25th percentile (scaled standard score of 90).    Interpretation of Test Scores:  Examination of individual component tests shows:  Attention - Trails A: Unlikely Impairment  Mental Flexibility - Trails B: Unlikely Impairment  Executive Function - Stroop: Unlikely Impairment  Processing Speed - Digit Symbol Substitution: Unlikely Impairment  Memory - Immediate Recognition: Unlikely Impairment  Memory - Delayed Recognition: Unlikely Impairment    The patient's overall cognitive test performance was a standard score of 96 out of 200, which is in the 41st percentile when compared to individuals of a similar age. These results suggest the patient's presence of cognitive impairment is unlikely.

## 2025-05-02 NOTE — PROGRESS NOTES
Neurology Clinic Follow up Note    Patient ID:  Adriana Stone  [id number]  59 y.o.  1965      Ms. Stone is here for follow up today of  Chief Complaint   Patient presents with    Memory Loss          Last Appointment With Me:  5/17/2024    \"Adriana Stone is presenting for evaluation of headaches. She reports onset of symptoms in 2018.     Location: Bi-temporal  Character: Throbbing  Intensity: On average 8/10   Frequency: 2x monthly  # HA free days per month: 28  Duration: 2 hours  Aura: None  Associated Sx with HA: no nausea/vomiting, +photophobia.    Neurological ROS: Denies focal weakness, numbness or vision loss associated with headaches  Systemic ROS:   No h/o snoring  Caffeine use: None  H/O Head trauma: None  Depressive or anxiety Sx: None     Any change in pattern of HA? As above, reports h/o \"sinus headaches\" bi-temporal throbbing since her teenage years, occasional cervicalgia     Triggers: Exercise. Non-positional.  Alleviating factors: Rest, warm compress  FHx HA/migraine: Brother-migraines    Treatment so far: N/A\"    Interval History:   Reports L supra-orbital headaches have resolved since last visit. No further exertional headaches reported.   She does report some difficulty recalling names, completing sentences due to word finding issues. These symptoms are relatively stable from last visit. Completed neuropsychologic testing recently which showed evidence of MCI, non-amnestic subtype.     PMHx/ PSHx/ FHx/ SHx:  Reviewed and unchanged previous visit.   Past Medical History:   Diagnosis Date    Advanced care planning/counseling discussion 4/8/16    Arrhythmia     Asthma     Cervical neck pain with evidence of disc disease     C4-C5 with radicular pain    Dry eye syndrome 6/2006    started after mirena IUD    Headache     Hearing loss     Hyperlipidemia 2/11/2010    Ill-defined condition     heart murmur    Memory disorder     Other ill-defined conditions(799.89)     abdominal hernia -

## 2025-05-03 LAB
T4 FREE SERPL-MCNC: 1.29 NG/DL (ref 0.82–1.77)
TSH SERPL DL<=0.005 MIU/L-ACNC: 1.9 UIU/ML (ref 0.45–4.5)
VIT B12 SERPL-MCNC: 1180 PG/ML (ref 232–1245)

## 2025-05-07 ENCOUNTER — TELEPHONE (OUTPATIENT)
Age: 60
End: 2025-05-07

## 2025-05-07 ENCOUNTER — RESULTS FOLLOW-UP (OUTPATIENT)
Age: 60
End: 2025-05-07

## 2025-05-07 LAB — METHYLMALONATE SERPL-SCNC: 144 NMOL/L (ref 0–378)

## 2025-08-08 ENCOUNTER — HOSPITAL ENCOUNTER (OUTPATIENT)
Facility: HOSPITAL | Age: 60
Setting detail: OUTPATIENT SURGERY
Discharge: HOME | End: 2025-08-08
Attending: INTERNAL MEDICINE | Admitting: INTERNAL MEDICINE
Payer: OTHER GOVERNMENT

## 2025-08-08 ENCOUNTER — ANESTHESIA (OUTPATIENT)
Facility: HOSPITAL | Age: 60
End: 2025-08-08
Payer: OTHER GOVERNMENT

## 2025-08-08 ENCOUNTER — ANESTHESIA EVENT (OUTPATIENT)
Facility: HOSPITAL | Age: 60
End: 2025-08-08
Payer: OTHER GOVERNMENT

## 2025-08-08 VITALS
DIASTOLIC BLOOD PRESSURE: 76 MMHG | TEMPERATURE: 97.9 F | HEART RATE: 70 BPM | OXYGEN SATURATION: 98 % | BODY MASS INDEX: 23.31 KG/M2 | RESPIRATION RATE: 13 BRPM | SYSTOLIC BLOOD PRESSURE: 133 MMHG | WEIGHT: 162.8 LBS | HEIGHT: 70 IN

## 2025-08-08 PROCEDURE — 6360000002 HC RX W HCPCS: Performed by: NURSE ANESTHETIST, CERTIFIED REGISTERED

## 2025-08-08 PROCEDURE — 7100000011 HC PHASE II RECOVERY - ADDTL 15 MIN: Performed by: INTERNAL MEDICINE

## 2025-08-08 PROCEDURE — 7100000010 HC PHASE II RECOVERY - FIRST 15 MIN: Performed by: INTERNAL MEDICINE

## 2025-08-08 PROCEDURE — 2580000003 HC RX 258: Performed by: INTERNAL MEDICINE

## 2025-08-08 PROCEDURE — 3700000000 HC ANESTHESIA ATTENDED CARE: Performed by: INTERNAL MEDICINE

## 2025-08-08 PROCEDURE — 3600007502: Performed by: INTERNAL MEDICINE

## 2025-08-08 RX ORDER — PREDNISOLONE ACETATE 10 MG/ML
SUSPENSION/ DROPS OPHTHALMIC
COMMUNITY
Start: 2025-07-28

## 2025-08-08 RX ORDER — TETRAHYDROZOLINE HCL 0.05 %
DROPS OPHTHALMIC (EYE)
COMMUNITY

## 2025-08-08 RX ORDER — SODIUM CHLORIDE 0.9 % (FLUSH) 0.9 %
5-40 SYRINGE (ML) INJECTION EVERY 12 HOURS SCHEDULED
Status: DISCONTINUED | OUTPATIENT
Start: 2025-08-08 | End: 2025-08-08 | Stop reason: HOSPADM

## 2025-08-08 RX ORDER — SODIUM CHLORIDE 9 MG/ML
INJECTION, SOLUTION INTRAVENOUS CONTINUOUS
Status: DISCONTINUED | OUTPATIENT
Start: 2025-08-08 | End: 2025-08-08 | Stop reason: HOSPADM

## 2025-08-08 RX ORDER — SODIUM CHLORIDE 0.9 % (FLUSH) 0.9 %
5-40 SYRINGE (ML) INJECTION PRN
Status: DISCONTINUED | OUTPATIENT
Start: 2025-08-08 | End: 2025-08-08 | Stop reason: HOSPADM

## 2025-08-08 RX ORDER — SODIUM CHLORIDE 9 MG/ML
INJECTION, SOLUTION INTRAVENOUS PRN
Status: DISCONTINUED | OUTPATIENT
Start: 2025-08-08 | End: 2025-08-08 | Stop reason: HOSPADM

## 2025-08-08 RX ORDER — ATORVASTATIN CALCIUM 10 MG/1
10 TABLET, FILM COATED ORAL DAILY
COMMUNITY
Start: 2025-08-06

## 2025-08-08 RX ORDER — LIDOCAINE HYDROCHLORIDE 20 MG/ML
INJECTION, SOLUTION EPIDURAL; INFILTRATION; INTRACAUDAL; PERINEURAL
Status: DISCONTINUED | OUTPATIENT
Start: 2025-08-08 | End: 2025-08-08 | Stop reason: SDUPTHER

## 2025-08-08 RX ADMIN — PROPOFOL 30 MG: 10 INJECTION, EMULSION INTRAVENOUS at 08:57

## 2025-08-08 RX ADMIN — PROPOFOL 50 MG: 10 INJECTION, EMULSION INTRAVENOUS at 09:01

## 2025-08-08 RX ADMIN — SODIUM CHLORIDE: 9 INJECTION, SOLUTION INTRAVENOUS at 08:39

## 2025-08-08 RX ADMIN — PROPOFOL 70 MG: 10 INJECTION, EMULSION INTRAVENOUS at 08:55

## 2025-08-08 RX ADMIN — LIDOCAINE HYDROCHLORIDE 40 MG: 20 INJECTION, SOLUTION EPIDURAL; INFILTRATION; INTRACAUDAL; PERINEURAL at 08:55

## 2025-08-08 RX ADMIN — PROPOFOL 50 MG: 10 INJECTION, EMULSION INTRAVENOUS at 09:05

## (undated) DEVICE — HOOK LOCK LATEX FREE ELASTIC BANDAGE 3INX5YD

## (undated) DEVICE — DEVON™ KNEE AND BODY STRAP 60" X 3" (1.5 M X 7.6 CM): Brand: DEVON

## (undated) DEVICE — (D)PREP SKN CHLRAPRP APPL 26ML -- CONVERT TO ITEM 371833

## (undated) DEVICE — SOL IRRIGATION INJ NACL 0.9% 500ML BTL

## (undated) DEVICE — REM POLYHESIVE ADULT PATIENT RETURN ELECTRODE: Brand: VALLEYLAB

## (undated) DEVICE — STERILE POLYISOPRENE POWDER-FREE SURGICAL GLOVES: Brand: PROTEXIS

## (undated) DEVICE — COVER LT HNDL BLU PLAS

## (undated) DEVICE — INTENDED FOR TISSUE SEPARATION, AND OTHER PROCEDURES THAT REQUIRE A SHARP SURGICAL BLADE TO PUNCTURE OR CUT.: Brand: BARD-PARKER ® CARBON RIB-BACK BLADES

## (undated) DEVICE — SUPER SPONGES,MEDIUM: Brand: DERMACEA

## (undated) DEVICE — BASIC PACK: Brand: CONVERTORS

## (undated) DEVICE — DRAPE,REIN 53X77,STERILE: Brand: MEDLINE

## (undated) DEVICE — ROCKER SWITCH PENCIL BLADE ELECTRODE, HOLSTER: Brand: EDGE

## (undated) DEVICE — BANDAGE COMPR 9 FTX4 IN SMOOTH COMFORTABLE SYNTH ESMRK LF

## (undated) DEVICE — TOWEL SURG W17XL27IN STD BLU COT NONFENESTRATED PREWASHED

## (undated) DEVICE — DRSG BURN GZ FN MSH 4X25IN --

## (undated) DEVICE — TUBING HYDR IRR --

## (undated) DEVICE — BANDAGE COMPR SELF ADH 5 YDX4 IN TAN STRL PREMIERPRO LF

## (undated) DEVICE — SURGICAL PROCEDURE PACK BASIN MAJ SET CUST NO CAUT

## (undated) DEVICE — DRAPE,EXTREMITY,89X128,STERILE: Brand: MEDLINE

## (undated) DEVICE — INFECTION CONTROL KIT SYS

## (undated) DEVICE — STRETCH BANDAGE ROLL: Brand: DERMACEA

## (undated) DEVICE — ZIMMER® STERILE DISPOSABLE TOURNIQUET CUFF WITH PROTECTIVE SLEEVE AND PLC, DUAL PORT, SINGLE BLADDER, 18 IN. (46 CM)

## (undated) DEVICE — FORCEPS BX L240CM JAW DIA2.8MM L CAP W/ NDL MIC MESH TOOTH